# Patient Record
Sex: MALE | Race: OTHER | Employment: UNEMPLOYED | URBAN - METROPOLITAN AREA
[De-identification: names, ages, dates, MRNs, and addresses within clinical notes are randomized per-mention and may not be internally consistent; named-entity substitution may affect disease eponyms.]

---

## 2021-01-18 ENCOUNTER — APPOINTMENT (OUTPATIENT)
Dept: ULTRASOUND IMAGING | Age: 46
End: 2021-01-18
Attending: INTERNAL MEDICINE

## 2021-01-18 ENCOUNTER — HOSPITAL ENCOUNTER (OUTPATIENT)
Age: 46
Setting detail: OBSERVATION
Discharge: HOME | End: 2021-01-20
Attending: EMERGENCY MEDICINE | Admitting: INTERNAL MEDICINE

## 2021-01-18 ENCOUNTER — APPOINTMENT (OUTPATIENT)
Dept: CT IMAGING | Age: 46
End: 2021-01-18
Attending: EMERGENCY MEDICINE

## 2021-01-18 DIAGNOSIS — R18.8 CIRRHOSIS OF LIVER WITH ASCITES, UNSPECIFIED HEPATIC CIRRHOSIS TYPE (HCC): Primary | ICD-10-CM

## 2021-01-18 DIAGNOSIS — K74.60 CIRRHOSIS OF LIVER WITH ASCITES, UNSPECIFIED HEPATIC CIRRHOSIS TYPE (HCC): Primary | ICD-10-CM

## 2021-01-18 PROBLEM — F10.10 ALCOHOL ABUSE: Status: ACTIVE | Noted: 2021-01-18

## 2021-01-18 LAB
ALBUMIN SERPL-MCNC: 2.7 G/DL (ref 3.5–5)
ALBUMIN/GLOB SERPL: 0.5 {RATIO} (ref 1.1–2.2)
ALP SERPL-CCNC: 139 U/L (ref 45–117)
ALT SERPL-CCNC: 14 U/L (ref 12–78)
AMMONIA PLAS-SCNC: 28 UMOL/L
AMPHET UR QL SCN: NEGATIVE
ANION GAP SERPL CALC-SCNC: 4 MMOL/L (ref 5–15)
APPEARANCE UR: ABNORMAL
AST SERPL-CCNC: 27 U/L (ref 15–37)
BACTERIA URNS QL MICRO: NEGATIVE /HPF
BARBITURATES UR QL SCN: NEGATIVE
BASOPHILS # BLD: 0.1 K/UL (ref 0–0.1)
BASOPHILS NFR BLD: 1 % (ref 0–1)
BENZODIAZ UR QL: NEGATIVE
BILIRUB SERPL-MCNC: 0.8 MG/DL (ref 0.2–1)
BILIRUB UR QL CFM: NEGATIVE
BUN SERPL-MCNC: 9 MG/DL (ref 6–20)
BUN/CREAT SERPL: 9 (ref 12–20)
CALCIUM SERPL-MCNC: 8.6 MG/DL (ref 8.5–10.1)
CANNABINOIDS UR QL SCN: NEGATIVE
CEA SERPL-MCNC: 1.8 NG/ML
CHLORIDE SERPL-SCNC: 101 MMOL/L (ref 97–108)
CHOLEST SERPL-MCNC: 98 MG/DL
CO2 SERPL-SCNC: 28 MMOL/L (ref 21–32)
COCAINE UR QL SCN: NEGATIVE
COLOR UR: ABNORMAL
COMMENT, HOLDF: NORMAL
CREAT SERPL-MCNC: 1.03 MG/DL (ref 0.7–1.3)
DIFFERENTIAL METHOD BLD: ABNORMAL
DRUG SCRN COMMENT,DRGCM: ABNORMAL
EOSINOPHIL # BLD: 0.4 K/UL (ref 0–0.4)
EOSINOPHIL NFR BLD: 6 % (ref 0–7)
EPITH CASTS URNS QL MICRO: ABNORMAL /LPF
ERYTHROCYTE [DISTWIDTH] IN BLOOD BY AUTOMATED COUNT: 12.7 % (ref 11.5–14.5)
EST. AVERAGE GLUCOSE BLD GHB EST-MCNC: 94 MG/DL
ETHANOL SERPL-MCNC: <10 MG/DL
GLOBULIN SER CALC-MCNC: 5.5 G/DL (ref 2–4)
GLUCOSE SERPL-MCNC: 106 MG/DL (ref 65–100)
GLUCOSE UR STRIP.AUTO-MCNC: NEGATIVE MG/DL
HAV IGM SER QL: NONREACTIVE
HBA1C MFR BLD: 4.9 % (ref 4–5.6)
HBV CORE IGM SER QL: NONREACTIVE
HBV SURFACE AG SER QL: <0.1 INDEX
HBV SURFACE AG SER QL: NEGATIVE
HCT VFR BLD AUTO: 39.6 % (ref 36.6–50.3)
HCV AB SERPL QL IA: NONREACTIVE
HCV COMMENT,HCGAC: NORMAL
HDLC SERPL-MCNC: 29 MG/DL
HDLC SERPL: 3.4 {RATIO} (ref 0–5)
HGB BLD-MCNC: 13.2 G/DL (ref 12.1–17)
HGB UR QL STRIP: NEGATIVE
HYALINE CASTS URNS QL MICRO: ABNORMAL /LPF (ref 0–5)
IMM GRANULOCYTES # BLD AUTO: 0 K/UL (ref 0–0.04)
IMM GRANULOCYTES NFR BLD AUTO: 0 % (ref 0–0.5)
INR PPP: 1.2 (ref 0.9–1.1)
KETONES UR QL STRIP.AUTO: ABNORMAL MG/DL
LACTATE SERPL-SCNC: 1.2 MMOL/L (ref 0.4–2)
LDLC SERPL CALC-MCNC: 55.8 MG/DL (ref 0–100)
LEUKOCYTE ESTERASE UR QL STRIP.AUTO: NEGATIVE
LIPASE SERPL-CCNC: 100 U/L (ref 73–393)
LIPID PROFILE,FLP: NORMAL
LYMPHOCYTES # BLD: 1 K/UL (ref 0.8–3.5)
LYMPHOCYTES NFR BLD: 15 % (ref 12–49)
MCH RBC QN AUTO: 31.3 PG (ref 26–34)
MCHC RBC AUTO-ENTMCNC: 33.3 G/DL (ref 30–36.5)
MCV RBC AUTO: 93.8 FL (ref 80–99)
METHADONE UR QL: NEGATIVE
MONOCYTES # BLD: 0.7 K/UL (ref 0–1)
MONOCYTES NFR BLD: 11 % (ref 5–13)
NEUTS SEG # BLD: 4.4 K/UL (ref 1.8–8)
NEUTS SEG NFR BLD: 67 % (ref 32–75)
NITRITE UR QL STRIP.AUTO: NEGATIVE
NRBC # BLD: 0 K/UL (ref 0–0.01)
NRBC BLD-RTO: 0 PER 100 WBC
OPIATES UR QL: POSITIVE
PCP UR QL: NEGATIVE
PH UR STRIP: 6 [PH] (ref 5–8)
PLATELET # BLD AUTO: 445 K/UL (ref 150–400)
PMV BLD AUTO: 8.8 FL (ref 8.9–12.9)
POTASSIUM SERPL-SCNC: 3.8 MMOL/L (ref 3.5–5.1)
PROT SERPL-MCNC: 8.2 G/DL (ref 6.4–8.2)
PROT UR STRIP-MCNC: 30 MG/DL
PROTHROMBIN TIME: 11.9 SEC (ref 9–11.1)
RBC # BLD AUTO: 4.22 M/UL (ref 4.1–5.7)
RBC #/AREA URNS HPF: ABNORMAL /HPF (ref 0–5)
SAMPLES BEING HELD,HOLD: NORMAL
SODIUM SERPL-SCNC: 133 MMOL/L (ref 136–145)
SP GR UR REFRACTOMETRY: 1.03 (ref 1–1.03)
SP1: NORMAL
SP2: NORMAL
SP3: NORMAL
TRIGL SERPL-MCNC: 66 MG/DL (ref ?–150)
TSH SERPL DL<=0.05 MIU/L-ACNC: 4.18 UIU/ML (ref 0.36–3.74)
UR CULT HOLD, URHOLD: NORMAL
UROBILINOGEN UR QL STRIP.AUTO: 1 EU/DL (ref 0.2–1)
VLDLC SERPL CALC-MCNC: 13.2 MG/DL
WBC # BLD AUTO: 6.6 K/UL (ref 4.1–11.1)
WBC URNS QL MICRO: ABNORMAL /HPF (ref 0–4)

## 2021-01-18 PROCEDURE — 85610 PROTHROMBIN TIME: CPT

## 2021-01-18 PROCEDURE — 83516 IMMUNOASSAY NONANTIBODY: CPT

## 2021-01-18 PROCEDURE — 96374 THER/PROPH/DIAG INJ IV PUSH: CPT

## 2021-01-18 PROCEDURE — 81001 URINALYSIS AUTO W/SCOPE: CPT

## 2021-01-18 PROCEDURE — 99284 EMERGENCY DEPT VISIT MOD MDM: CPT

## 2021-01-18 PROCEDURE — 99218 HC RM OBSERVATION: CPT

## 2021-01-18 PROCEDURE — 74177 CT ABD & PELVIS W/CONTRAST: CPT

## 2021-01-18 PROCEDURE — 82378 CARCINOEMBRYONIC ANTIGEN: CPT

## 2021-01-18 PROCEDURE — 83605 ASSAY OF LACTIC ACID: CPT

## 2021-01-18 PROCEDURE — 83036 HEMOGLOBIN GLYCOSYLATED A1C: CPT

## 2021-01-18 PROCEDURE — 80053 COMPREHEN METABOLIC PANEL: CPT

## 2021-01-18 PROCEDURE — 80307 DRUG TEST PRSMV CHEM ANLYZR: CPT

## 2021-01-18 PROCEDURE — 83520 IMMUNOASSAY QUANT NOS NONAB: CPT

## 2021-01-18 PROCEDURE — 74011250636 HC RX REV CODE- 250/636: Performed by: EMERGENCY MEDICINE

## 2021-01-18 PROCEDURE — 86301 IMMUNOASSAY TUMOR CA 19-9: CPT

## 2021-01-18 PROCEDURE — 80061 LIPID PANEL: CPT

## 2021-01-18 PROCEDURE — 85025 COMPLETE CBC W/AUTO DIFF WBC: CPT

## 2021-01-18 PROCEDURE — 83690 ASSAY OF LIPASE: CPT

## 2021-01-18 PROCEDURE — 82105 ALPHA-FETOPROTEIN SERUM: CPT

## 2021-01-18 PROCEDURE — 74011000636 HC RX REV CODE- 636: Performed by: RADIOLOGY

## 2021-01-18 PROCEDURE — 84443 ASSAY THYROID STIM HORMONE: CPT

## 2021-01-18 PROCEDURE — 74011250636 HC RX REV CODE- 250/636: Performed by: INTERNAL MEDICINE

## 2021-01-18 PROCEDURE — 86256 FLUORESCENT ANTIBODY TITER: CPT

## 2021-01-18 PROCEDURE — 74011250637 HC RX REV CODE- 250/637: Performed by: INTERNAL MEDICINE

## 2021-01-18 PROCEDURE — 36415 COLL VENOUS BLD VENIPUNCTURE: CPT

## 2021-01-18 PROCEDURE — 80074 ACUTE HEPATITIS PANEL: CPT

## 2021-01-18 PROCEDURE — 86038 ANTINUCLEAR ANTIBODIES: CPT

## 2021-01-18 PROCEDURE — 82140 ASSAY OF AMMONIA: CPT

## 2021-01-18 PROCEDURE — 82077 ASSAY SPEC XCP UR&BREATH IA: CPT

## 2021-01-18 RX ORDER — MULTIVITAMIN WITH IRON
1 TABLET ORAL DAILY
Status: DISCONTINUED | OUTPATIENT
Start: 2021-01-19 | End: 2021-01-20 | Stop reason: HOSPADM

## 2021-01-18 RX ORDER — ONDANSETRON 4 MG/1
4 TABLET, ORALLY DISINTEGRATING ORAL
Status: DISCONTINUED | OUTPATIENT
Start: 2021-01-18 | End: 2021-01-20 | Stop reason: HOSPADM

## 2021-01-18 RX ORDER — HYDROMORPHONE HYDROCHLORIDE 1 MG/ML
0.5 INJECTION, SOLUTION INTRAMUSCULAR; INTRAVENOUS; SUBCUTANEOUS ONCE
Status: COMPLETED | OUTPATIENT
Start: 2021-01-18 | End: 2021-01-18

## 2021-01-18 RX ORDER — ACETAMINOPHEN 650 MG/1
650 SUPPOSITORY RECTAL
Status: DISCONTINUED | OUTPATIENT
Start: 2021-01-18 | End: 2021-01-20 | Stop reason: HOSPADM

## 2021-01-18 RX ORDER — DEXTROSE, SODIUM CHLORIDE, AND POTASSIUM CHLORIDE 5; .45; .15 G/100ML; G/100ML; G/100ML
50 INJECTION INTRAVENOUS CONTINUOUS
Status: DISCONTINUED | OUTPATIENT
Start: 2021-01-18 | End: 2021-01-20 | Stop reason: HOSPADM

## 2021-01-18 RX ORDER — ONDANSETRON 2 MG/ML
4 INJECTION INTRAMUSCULAR; INTRAVENOUS
Status: DISCONTINUED | OUTPATIENT
Start: 2021-01-18 | End: 2021-01-20 | Stop reason: HOSPADM

## 2021-01-18 RX ORDER — ACETAMINOPHEN 325 MG/1
650 TABLET ORAL
Status: DISCONTINUED | OUTPATIENT
Start: 2021-01-18 | End: 2021-01-20 | Stop reason: HOSPADM

## 2021-01-18 RX ORDER — ASPIRIN 325 MG/1
100 TABLET, FILM COATED ORAL DAILY
Status: DISCONTINUED | OUTPATIENT
Start: 2021-01-18 | End: 2021-01-20 | Stop reason: HOSPADM

## 2021-01-18 RX ORDER — ACETAMINOPHEN 325 MG/1
650 TABLET ORAL
COMMUNITY
End: 2021-02-12

## 2021-01-18 RX ORDER — POLYETHYLENE GLYCOL 3350 17 G/17G
17 POWDER, FOR SOLUTION ORAL DAILY PRN
Status: DISCONTINUED | OUTPATIENT
Start: 2021-01-18 | End: 2021-01-20 | Stop reason: HOSPADM

## 2021-01-18 RX ORDER — FAMOTIDINE 20 MG/1
20 TABLET, FILM COATED ORAL 2 TIMES DAILY
Status: DISCONTINUED | OUTPATIENT
Start: 2021-01-18 | End: 2021-01-20 | Stop reason: HOSPADM

## 2021-01-18 RX ORDER — FOLIC ACID 1 MG/1
1 TABLET ORAL DAILY
Status: DISCONTINUED | OUTPATIENT
Start: 2021-01-19 | End: 2021-01-20 | Stop reason: HOSPADM

## 2021-01-18 RX ADMIN — FAMOTIDINE 20 MG: 20 TABLET, FILM COATED ORAL at 13:46

## 2021-01-18 RX ADMIN — IOPAMIDOL 100 ML: 755 INJECTION, SOLUTION INTRAVENOUS at 08:44

## 2021-01-18 RX ADMIN — ACETAMINOPHEN 650 MG: 325 TABLET ORAL at 17:08

## 2021-01-18 RX ADMIN — Medication 100 MG: at 13:46

## 2021-01-18 RX ADMIN — HYDROMORPHONE HYDROCHLORIDE 0.5 MG: 1 INJECTION, SOLUTION INTRAMUSCULAR; INTRAVENOUS; SUBCUTANEOUS at 08:20

## 2021-01-18 RX ADMIN — POTASSIUM CHLORIDE, DEXTROSE MONOHYDRATE AND SODIUM CHLORIDE 50 ML/HR: 150; 5; 450 INJECTION, SOLUTION INTRAVENOUS at 13:46

## 2021-01-18 RX ADMIN — FAMOTIDINE 20 MG: 20 TABLET, FILM COATED ORAL at 17:08

## 2021-01-18 NOTE — H&P
[facility]    Hospitalist Admission Note      NAME:  Dipesh Manrique   :   1975   MRN:  [id number]     PCP:  None     Date/Time of service:  2021 10:36 AM          Subjective:     CHIEF COMPLAINT: ascites     HISTORY OF PRESENT ILLNESS:     Mr. Manrique is a 46 y.o.   male who presented to the Emergency Department complaining of ascites.  Worsening over the month.  Hx of alcohol abuse but claims no alcohol for a month.  ER finds ascites, CT shows BCD dilation, but labs do not show signs of cirrhosis in proportion to ascitic changes. We will admit him for observation.      No past medical history on file.     No past surgical history on file.    Social History     Tobacco Use   • Smoking status: Not on file   Substance Use Topics   • Alcohol use: Not on file        No family history on file.   Family hx cannot be fully assessed, since the patient cannot provide information    No Known Allergies     Prior to Admission medications    Not on File       Review of Systems:  (bold if positive, if negative)    Gen:  Eyes:  ENT:  CVS:  Pulm:  GI:  Abdominal pain,:  MS:  Skin:  Psych:  Endo:  Hem:  Renal:  Neuro:        Objective:      VITALS:    Vital signs reviewed; most recent are:    Visit Vitals  /83 (BP 1 Location: Right arm, BP Patient Position: At rest)   Pulse 83   Temp 97.9 °F (36.6 °C)   Resp 16   SpO2 98%     SpO2 Readings from Last 6 Encounters:   21 98%        No intake or output data in the 24 hours ending 21 1036     Exam:     Physical Exam:    Gen:  Well-developed, well-nourished, in no acute distress  HEENT:  Pink conjunctivae, PERRL, hearing intact to voice, moist mucous membranes  Neck:  Supple, without masses, thyroid non-tender  Resp:  No accessory muscle use, clear breath sounds without wheezes rales or rhonchi  Card:  No murmurs, normal S1, S2 without thrills, bruits or peripheral edema  Abd:  Firm, tender, distended, distant bowel sounds are  present, no mass  Lymph:  No cervical or inguinal adenopathy  Musc:  No cyanosis or clubbing  Skin:  No rashes or ulcers, skin turgor is good  Neuro:  Cranial nerves are grossly intact, no focal motor weakness, follows commands appropriately  Psych:  Good insight, oriented to person, place and time, alert     Labs:    Recent Labs     01/18/21 0626   WBC 6.6   HGB 13.2   HCT 39.6   *     Recent Labs     01/18/21 0626   *   K 3.8      CO2 28   *   BUN 9   CREA 1.03   CA 8.6   ALB 2.7*   TBILI 0.8   ALT 14     No results found for: GLUCPOC  No results for input(s): PH, PCO2, PO2, HCO3, FIO2 in the last 72 hours. Recent Labs     01/18/21 0626   INR 1.2*     All Micro Results     Procedure Component Value Units Date/Time    CULTURE, BODY FLUID [[id number]]     Order Status: Sent Specimen: Body Fluid from Ascitic Fluid     AFB CULTURE & SMEAR W/REFLEX ID [[id number]]     Order Status: Sent     URINE CULTURE HOLD SAMPLE [[id number]] Collected: 01/18/21 0841    Order Status: Completed Specimen: Serum Updated: 01/18/21 0845     Urine culture hold       Urine on hold in Microbiology dept for 2 days. If unpreserved urine is submitted, it cannot be used for addtional testing after 24 hours, recollection will be required. I have reviewed previous records       Assessment and Plan:      Ascites - POA, unclear etiology. Certainly alcoholic cirrhosis is top of DDX, but his labs are unusually benign, only moderately low albumin, so differential includes hepatitis, autoimmune and cancer. Consult GI . Consult IR fo paracentesis and checking fluid labs. Check serologies for autoimmune, infectious and cancer. GI may consider doing ERCP for Bx of CBD. NPO in case they want to proceed. Alcohol abuse - POA, unlikely to go into withdrawal if he is accurate about 1 month sobriety. In any case, goody bag.     Telemetry reviewed:   normal sinus rhythm    Risk of deterioration: high      Total time spent with patient: 54 Minutes I personally reviewed chart, notes, data and current medications in the medical record. I have personally examined and treated the patient at bedside during this period.                  Care Plan discussed with: Family, Nursing Staff, Consultant/Specialist and >50% of time spent in counseling and coordination of care    Discussed:  Care Plan and D/C Planning       ___________________________________________________    Attending Physician: Patrick Ramirez MD

## 2021-01-18 NOTE — PROGRESS NOTES
[facility]: MED RECONCILIATION    Comments/Recommendations:   Reviewed PTA medications with patient via [employer] interpretor #[id number]. Patient reports that he does not currently take any prescription medications and that he only uses as needed tylenol. Recent history 30 day supplies of folic acid 1 mg; furosemide 20 mg; spironolactone 50 mg; pantoprazole 40 mg however patient states he took these for a month from 11/20-12/20 and ran out of supply. Updated preferred outpatient pharmacy, patient reports NKDA    Information obtained from: Patient via interpretor, [employer]    Allergies: Patient has no known allergies. Prior to Admission Medications:     Medication Documentation Review Audit       Reviewed by Sammie Henry, Maria De Jesus Fischer (Pharmacist) on 01/18/21 at 1213      Medication Sig Documenting Provider Last Dose Status Taking?   acetaminophen (TYLENOL) 325 mg tablet Take 650 mg by mouth every six (6) hours as needed for Pain.  Provider, Historical 1/11/2021 Active Yes                  Thanks,  Nadira Chang, PHARMD   Contact: [phone]

## 2021-01-18 NOTE — ED NOTES
TRANSFER - OUT REPORT:    Verbal report given to Rosario Valencia RN(name) on Antoinette Starling  being transferred to [facility](unit) for routine progression of care       Report consisted of patients Situation, Background, Assessment and   Recommendations(SBAR). Information from the following report(s) SBAR, Kardex, ED Summary and MAR was reviewed with the receiving nurse. Lines:       Opportunity for questions and clarification was provided.       Patient transported with:   [employer]

## 2021-01-18 NOTE — ED PROVIDER NOTES
53-year-old  male with no reported prior past medical history with history of prior EtOH abuse but reported last drink the end of December, presents to the emergency department noting a 1 month history of progressively worsening abdominal distention and pain. He states that his stomach is so large and distended that it hurts more when he walks or moves his trunk. He denies any fever, chills, nausea, vomiting, diarrhea, dysuria, or hematuria. He denies any known history of prior liver disease. He has been taking Tylenol intermittently to no avail of the symptoms. The history is provided by the patient. The history is limited by a language barrier. A  was used. No past medical history on file. No past surgical history on file. No family history on file.     Social History     Socioeconomic History    Marital status: Not on file     Spouse name: Not on file    Number of children: Not on file    Years of education: Not on file    Highest education level: Not on file   Occupational History    Not on file   Social Needs    Financial resource strain: Not on file    Food insecurity     Worry: Not on file     Inability: Not on file    Transportation needs     Medical: Not on file     Non-medical: Not on file   Tobacco Use    Smoking status: Not on file   Substance and Sexual Activity    Alcohol use: Not on file    Drug use: Not on file    Sexual activity: Not on file   Lifestyle    Physical activity     Days per week: Not on file     Minutes per session: Not on file    Stress: Not on file   Relationships    Social connections     Talks on phone: Not on file     Gets together: Not on file     Attends [employer] service: Not on file     Active member of club or organization: Not on file     Attends meetings of clubs or organizations: Not on file     Relationship status: Not on file    Intimate partner violence     Fear of current or ex partner: Not on file Emotionally abused: Not on file     Physically abused: Not on file     Forced sexual activity: Not on file   Other Topics Concern    Not on file   Social History Narrative    Not on file         ALLERGIES: Patient has no known allergies. Review of Systems   Constitutional: Negative for activity change, appetite change, chills and fever. HENT: Negative for congestion, rhinorrhea, sinus pain, sneezing and sore throat. Eyes: Negative for photophobia and visual disturbance. Respiratory: Negative for cough and shortness of breath. Cardiovascular: Negative for chest pain. Gastrointestinal: Positive for abdominal distention and abdominal pain. Negative for blood in stool, constipation, diarrhea, nausea and vomiting. Genitourinary: Negative for difficulty urinating, dysuria, flank pain, hematuria, penile pain and testicular pain. Musculoskeletal: Negative for arthralgias, back pain, myalgias and neck pain. Skin: Negative for rash and wound. Neurological: Negative for syncope, weakness, light-headedness, numbness and headaches. Psychiatric/Behavioral: Negative for self-injury and suicidal ideas. All other systems reviewed and are negative. Vitals:    01/18/21 0613   BP: 114/83   Pulse: 83   Resp: 16   Temp: 97.9 °F (36.6 °C)   SpO2: 98%            Physical Exam  Vitals signs and nursing note reviewed. Constitutional:       General: He is not in acute distress. Appearance: Normal appearance. He is well-developed. He is not diaphoretic. Comments: Cachectic   HENT:      Head: Normocephalic and atraumatic. Nose: Nose normal.   Eyes:      Extraocular Movements: Extraocular movements intact. Conjunctiva/sclera: Conjunctivae normal.      Pupils: Pupils are equal, round, and reactive to light. Neck:      Musculoskeletal: Neck supple. Cardiovascular:      Rate and Rhythm: Normal rate and regular rhythm. Heart sounds: Normal heart sounds.    Pulmonary:      Effort: Pulmonary effort is normal.      Breath sounds: Normal breath sounds. Abdominal:      General: There is distension (Massively distended). Palpations: Abdomen is rigid. There is fluid wave. Tenderness: There is generalized abdominal tenderness. Musculoskeletal:         General: No tenderness. Skin:     General: Skin is warm and dry. Coloration: Skin is jaundiced. Neurological:      General: No focal deficit present. Mental Status: He is alert and oriented to person, place, and time. Cranial Nerves: No cranial nerve deficit. Sensory: No sensory deficit. Motor: No weakness. Coordination: Coordination normal.          MDM   79-year-old male presents with jaundice and abdominal distention. Concern for cirrhosis, likely EtOH induced. Patient is afebrile with vital signs stable no acute distress. He was given IV pain medication and labs were drawn to further evaluate as well as CT abdomen pelvis and UA. While awaiting these results his care was signed out to Dr. Mirza Orr at 7 AM.  Please see her note for further information regarding the remainder of his care while in the ED.     Procedures

## 2021-01-18 NOTE — PROGRESS NOTES
Reason for Admission:   Ascities                   RUR Score:   OBS                   Plan for utilizing home health:     Unsure, but no ins. Current Advanced Directive/Advance Care Plan: Full, No ACP                         Transition of Care Plan:    I met with the patient using the . Patient lives with family in in a 2 story home. No pharmacy of choice. No prior HH or DME. No PCP Patient is independent on ADL's but does not drive. Plan:  1. Monitor patient's response to treatment. 2. Patient will most likely not qualify for Medicare. No ins. 3. Friend to transport at discharge. 4. Unsure of needs. 5. Case Management to follow. OBS letter received and signed, copy on chart. Care Management Interventions  PCP Verified by CM: (none)  Mode of Transport at Discharge:  Other (see comment)(friend)  Transition of Care Consult (CM Consult): Discharge Planning  MyChart Signup: No  Discharge Durable Medical Equipment: No  Health Maintenance Reviewed: Yes  Physical Therapy Consult: No  Occupational Therapy Consult: No  Speech Therapy Consult: No  Current Support Network: Relative's Home  Confirm Follow Up Transport: Friends  [place] Resource Information Provided?: No  Discharge Location  Discharge Placement: Home      [facility]

## 2021-01-19 ENCOUNTER — APPOINTMENT (OUTPATIENT)
Dept: ULTRASOUND IMAGING | Age: 46
End: 2021-01-19
Attending: INTERNAL MEDICINE

## 2021-01-19 LAB
AFP-TM SERPL-MCNC: 2.3 NG/ML (ref 0–8.3)
ALBUMIN FLD-MCNC: 1.8 G/DL
ALBUMIN SERPL-MCNC: 2.6 G/DL (ref 3.5–5)
ALBUMIN/GLOB SERPL: 0.6 {RATIO} (ref 1.1–2.2)
ALP SERPL-CCNC: 112 U/L (ref 45–117)
ALT SERPL-CCNC: 12 U/L (ref 12–78)
ANA SER QL: NEGATIVE
ANION GAP SERPL CALC-SCNC: 4 MMOL/L (ref 5–15)
APPEARANCE FLD: CLEAR
AST SERPL-CCNC: 24 U/L (ref 15–37)
BILIRUB SERPL-MCNC: 0.9 MG/DL (ref 0.2–1)
BUN SERPL-MCNC: 12 MG/DL (ref 6–20)
BUN/CREAT SERPL: 12 (ref 12–20)
C-ANCA TITR SER IF: NORMAL TITER
CALCIUM SERPL-MCNC: 8.3 MG/DL (ref 8.5–10.1)
CANCER AG19-9 SERPL-ACNC: <2 U/ML (ref 0–35)
CHLORIDE SERPL-SCNC: 103 MMOL/L (ref 97–108)
CO2 SERPL-SCNC: 26 MMOL/L (ref 21–32)
COLOR FLD: YELLOW
CREAT SERPL-MCNC: 0.97 MG/DL (ref 0.7–1.3)
ERYTHROCYTE [DISTWIDTH] IN BLOOD BY AUTOMATED COUNT: 12.9 % (ref 11.5–14.5)
FOLATE SERPL-MCNC: 21.4 NG/ML (ref 5–21)
GLOBULIN SER CALC-MCNC: 4.7 G/DL (ref 2–4)
GLUCOSE SERPL-MCNC: 104 MG/DL (ref 65–100)
HCT VFR BLD AUTO: 36.2 % (ref 36.6–50.3)
HGB BLD-MCNC: 11.9 G/DL (ref 12.1–17)
INR PPP: 1.2 (ref 0.9–1.1)
LDH FLD L TO P-CCNC: 74 U/L
LYMPHOCYTES NFR FLD: 51 %
MAGNESIUM SERPL-MCNC: 2.2 MG/DL (ref 1.6–2.4)
MCH RBC QN AUTO: 30.5 PG (ref 26–34)
MCHC RBC AUTO-ENTMCNC: 32.9 G/DL (ref 30–36.5)
MCV RBC AUTO: 92.8 FL (ref 80–99)
MITOCHONDRIA M2 IGG SER-ACNC: 23.9 UNITS (ref 0–20)
MONOS+MACROS NFR FLD: 46 %
MYELOPEROXIDASE AB SER IA-ACNC: <9 U/ML (ref 0–9)
NEUTROPHILS NFR FLD: 3 %
NRBC # BLD: 0 K/UL (ref 0–0.01)
NRBC BLD-RTO: 0 PER 100 WBC
NUC CELL # FLD: 184 /CU MM
P-ANCA ATYPICAL TITR SER IF: NORMAL TITER
P-ANCA TITR SER IF: NORMAL TITER
PHOSPHATE SERPL-MCNC: 4 MG/DL (ref 2.6–4.7)
PLATELET # BLD AUTO: 424 K/UL (ref 150–400)
PMV BLD AUTO: 9.3 FL (ref 8.9–12.9)
POTASSIUM SERPL-SCNC: 4.3 MMOL/L (ref 3.5–5.1)
PROT FLD-MCNC: 4 G/DL
PROT SERPL-MCNC: 7.3 G/DL (ref 6.4–8.2)
PROTEINASE3 AB SER IA-ACNC: <3.5 U/ML (ref 0–3.5)
PROTHROMBIN TIME: 12.2 SEC (ref 9–11.1)
RBC # BLD AUTO: 3.9 M/UL (ref 4.1–5.7)
RBC # FLD: 6 /CU MM
SODIUM SERPL-SCNC: 133 MMOL/L (ref 136–145)
SPECIMEN SOURCE FLD: ABNORMAL
SPECIMEN SOURCE FLD: NORMAL
VIT B12 SERPL-MCNC: 479 PG/ML (ref 193–986)
WBC # BLD AUTO: 6.5 K/UL (ref 4.1–11.1)

## 2021-01-19 PROCEDURE — 88305 TISSUE EXAM BY PATHOLOGIST: CPT

## 2021-01-19 PROCEDURE — 83615 LACTATE (LD) (LDH) ENZYME: CPT

## 2021-01-19 PROCEDURE — 87015 SPECIMEN INFECT AGNT CONCNTJ: CPT

## 2021-01-19 PROCEDURE — 36415 COLL VENOUS BLD VENIPUNCTURE: CPT

## 2021-01-19 PROCEDURE — 82607 VITAMIN B-12: CPT

## 2021-01-19 PROCEDURE — 49083 ABD PARACENTESIS W/IMAGING: CPT

## 2021-01-19 PROCEDURE — 82746 ASSAY OF FOLIC ACID SERUM: CPT

## 2021-01-19 PROCEDURE — 82042 OTHER SOURCE ALBUMIN QUAN EA: CPT

## 2021-01-19 PROCEDURE — 74011250636 HC RX REV CODE- 250/636: Performed by: INTERNAL MEDICINE

## 2021-01-19 PROCEDURE — 96375 TX/PRO/DX INJ NEW DRUG ADDON: CPT

## 2021-01-19 PROCEDURE — P9047 ALBUMIN (HUMAN), 25%, 50ML: HCPCS | Performed by: RADIOLOGY

## 2021-01-19 PROCEDURE — 84157 ASSAY OF PROTEIN OTHER: CPT

## 2021-01-19 PROCEDURE — 83735 ASSAY OF MAGNESIUM: CPT

## 2021-01-19 PROCEDURE — 87205 SMEAR GRAM STAIN: CPT

## 2021-01-19 PROCEDURE — 85027 COMPLETE CBC AUTOMATED: CPT

## 2021-01-19 PROCEDURE — 87116 MYCOBACTERIA CULTURE: CPT

## 2021-01-19 PROCEDURE — 99218 HC RM OBSERVATION: CPT

## 2021-01-19 PROCEDURE — 84100 ASSAY OF PHOSPHORUS: CPT

## 2021-01-19 PROCEDURE — 88112 CYTOPATH CELL ENHANCE TECH: CPT

## 2021-01-19 PROCEDURE — 80053 COMPREHEN METABOLIC PANEL: CPT

## 2021-01-19 PROCEDURE — 74011000250 HC RX REV CODE- 250: Performed by: INTERNAL MEDICINE

## 2021-01-19 PROCEDURE — 74011250636 HC RX REV CODE- 250/636: Performed by: RADIOLOGY

## 2021-01-19 PROCEDURE — 85610 PROTHROMBIN TIME: CPT

## 2021-01-19 PROCEDURE — 89050 BODY FLUID CELL COUNT: CPT

## 2021-01-19 PROCEDURE — 74011250637 HC RX REV CODE- 250/637: Performed by: INTERNAL MEDICINE

## 2021-01-19 PROCEDURE — 74011000250 HC RX REV CODE- 250: Performed by: RADIOLOGY

## 2021-01-19 RX ORDER — LORAZEPAM 2 MG/ML
2 INJECTION INTRAMUSCULAR
Status: DISCONTINUED | OUTPATIENT
Start: 2021-01-19 | End: 2021-01-20 | Stop reason: HOSPADM

## 2021-01-19 RX ORDER — ALBUMIN HUMAN 250 G/1000ML
25 SOLUTION INTRAVENOUS
Status: DISCONTINUED | OUTPATIENT
Start: 2021-01-19 | End: 2021-01-20 | Stop reason: HOSPADM

## 2021-01-19 RX ORDER — LORAZEPAM 2 MG/ML
4 INJECTION INTRAMUSCULAR
Status: DISCONTINUED | OUTPATIENT
Start: 2021-01-19 | End: 2021-01-20 | Stop reason: HOSPADM

## 2021-01-19 RX ORDER — LIDOCAINE HYDROCHLORIDE 10 MG/ML
10 INJECTION, SOLUTION EPIDURAL; INFILTRATION; INTRACAUDAL; PERINEURAL
Status: COMPLETED | OUTPATIENT
Start: 2021-01-19 | End: 2021-01-19

## 2021-01-19 RX ADMIN — FOLIC ACID 1 MG: 1 TABLET ORAL at 08:53

## 2021-01-19 RX ADMIN — FAMOTIDINE 20 MG: 20 TABLET, FILM COATED ORAL at 08:52

## 2021-01-19 RX ADMIN — Medication 1 TABLET: at 08:52

## 2021-01-19 RX ADMIN — CEFTRIAXONE 1 G: 1 INJECTION, POWDER, FOR SOLUTION INTRAMUSCULAR; INTRAVENOUS at 16:51

## 2021-01-19 RX ADMIN — POTASSIUM CHLORIDE, DEXTROSE MONOHYDRATE AND SODIUM CHLORIDE 50 ML/HR: 150; 5; 450 INJECTION, SOLUTION INTRAVENOUS at 16:53

## 2021-01-19 RX ADMIN — Medication 100 MG: at 08:53

## 2021-01-19 RX ADMIN — ACETAMINOPHEN 650 MG: 325 TABLET ORAL at 04:13

## 2021-01-19 RX ADMIN — FAMOTIDINE 20 MG: 20 TABLET, FILM COATED ORAL at 17:09

## 2021-01-19 RX ADMIN — ALBUMIN (HUMAN) 25 G: 0.25 INJECTION, SOLUTION INTRAVENOUS at 14:45

## 2021-01-19 RX ADMIN — LIDOCAINE HYDROCHLORIDE 10 ML: 10 INJECTION, SOLUTION EPIDURAL; INFILTRATION; INTRACAUDAL; PERINEURAL at 15:00

## 2021-01-19 NOTE — PROGRESS NOTES
Transition of Care Plan; OBS  1. Monitor patient's response to treatment. 2. Patient will most likely not qualify for Medicare. No ins. 3. Friend to transport at discharge. 4. Will need paracentesis and transfuse as needed. 5. Case Management to follow.   BULL Jauregui

## 2021-01-19 NOTE — CONSULTS
Rayray Cuevas MD  [phone] office  [phone] voicemail   Gastroenterology Consultation Note      Admit Date: 1/18/2021  Consult Date: 1/19/2021   I greatly appreciate your asking me to see Dipesh Manrique, thank you very much for the opportunity to participate in his care.    Narrative Assessment and Plan   · New onset ascites  · Cirrhotic appearing liver  · Mild elevation in INR    Screening serologic tests thus far are negative, some are still pending.  Needs paracentesis for comfort and fluid analysis  Will need ethanol avoidance and low sodium diet on discharge  Awaiting paracentesis and fluid analysis to finalize recommendations and ensure this is just transudative ascites of cirrhosis.  CEA, AFP,  and imaging not suggestive of neoplasm.  On discharge would advise follow up at [facility] liver clinic for longitudinal care.  Following.    Subjective:     Chief Complaint: Abdominal Pain    History of Present Illness: Abdominal pain, diffuse, days, associated with abdominal swelling.    PCP:  None    No past medical history on file.     No past surgical history on file.    Social History     Tobacco Use   • Smoking status: Not on file   Substance Use Topics   • Alcohol use: Not on file        No family history on file.     No Known Allergies         Home Medications:  Prior to Admission Medications   Prescriptions Last Dose Informant Patient Reported? Taking?   acetaminophen (TYLENOL) 325 mg tablet 1/11/2021 Self Yes Yes   Sig: Take 650 mg by mouth every six (6) hours as needed for Pain.      Facility-Administered Medications: None       Hospital Medications:  Current Facility-Administered Medications   Medication Dose Route Frequency   • LORazepam (ATIVAN) injection 2 mg  2 mg IntraVENous Q1H PRN   • LORazepam (ATIVAN) injection 4 mg  4 mg IntraVENous Q1H PRN   • acetaminophen (TYLENOL) tablet 650 mg  650 mg Oral Q6H PRN    Or   • acetaminophen (TYLENOL)  suppository 650 mg  650 mg Rectal Q6H PRN    polyethylene glycol (MIRALAX) packet 17 g  17 g Oral DAILY PRN    ondansetron (ZOFRAN ODT) tablet 4 mg  4 mg Oral Q8H PRN    Or    ondansetron (ZOFRAN) injection 4 mg  4 mg IntraVENous Q6H PRN    famotidine (PEPCID) tablet 20 mg  20 mg Oral BID    dextrose 5% - 0.45% NaCl with KCl 20 mEq/L infusion  50 mL/hr IntraVENous CONTINUOUS    thiamine mononitrate (B-1) tablet 100 mg  100 mg Oral DAILY    folic acid (FOLVITE) tablet 1 mg  1 mg Oral DAILY    multivitamin with iron tablet 1 Tab  1 Tab Oral DAILY           Objective:     Physical Exam:  Visit Vitals  /81 (BP 1 Location: Right arm, BP Patient Position: At rest)   Pulse 79   Temp 98.3 °F (36.8 °C)   Resp 16   Wt 61.6 kg (135 lb 11.2 oz)   SpO2 98%     SpO2 Readings from Last 6 Encounters:   01/19/21 98%            Intake/Output Summary (Last 24 hours) at 1/19/2021 1155  Last data filed at 1/19/2021 0454  Gross per 24 hour   Intake 756.67 ml   Output    Net 756.67 ml      General: no distress, comfortable  Skin:  No rash or palpable dermatologic mass lesions  HEENT: Pupils equal, sclera anicteric, oropharynx with no gross lesions  Cardiovascular: No abnormal audible heart sounds, well perfused  Respiratory:  No abnormal audible breath sounds, normal respiratory effort, no throacic deformity  GI:  Abdomen nondistended with tense ascites, no mass,  no rebound or guarding. Musculoskeletal:  No skeletal deformity nor acute arthritis noted.   Neurological:  Motor and sensory function intact in upper extremeties    Laboratory:    Recent Results (from the past 24 hour(s))   METABOLIC PANEL, COMPREHENSIVE    Collection Time: 01/19/21  6:56 AM   Result Value Ref Range    Sodium 133 (L) 136 - 145 mmol/L    Potassium 4.3 3.5 - 5.1 mmol/L    Chloride 103 97 - 108 mmol/L    CO2 26 21 - 32 mmol/L    Anion gap 4 (L) 5 - 15 mmol/L    Glucose 104 (H) 65 - 100 mg/dL    BUN 12 6 - 20 MG/DL    Creatinine 0.97 0.70 - 1.30 MG/DL    BUN/Creatinine ratio 12 12 - 20      GFR est AA >60 >60 ml/min/1.73m2    GFR est non-AA >60 >60 ml/min/1.73m2    Calcium 8.3 (L) 8.5 - 10.1 MG/DL    Bilirubin, total 0.9 0.2 - 1.0 MG/DL    ALT (SGPT) 12 12 - 78 U/L    AST (SGOT) 24 15 - 37 U/L    Alk. phosphatase 112 45 - 117 U/L    Protein, total 7.3 6.4 - 8.2 g/dL    Albumin 2.6 (L) 3.5 - 5.0 g/dL    Globulin 4.7 (H) 2.0 - 4.0 g/dL    A-G Ratio 0.6 (L) 1.1 - 2.2     MAGNESIUM    Collection Time: 01/19/21  6:56 AM   Result Value Ref Range    Magnesium 2.2 1.6 - 2.4 mg/dL   CBC W/O DIFF    Collection Time: 01/19/21  6:56 AM   Result Value Ref Range    WBC 6.5 4.1 - 11.1 K/uL    RBC 3.90 (L) 4.10 - 5.70 M/uL    HGB 11.9 (L) 12.1 - 17.0 g/dL    HCT 36.2 (L) 36.6 - 50.3 %    MCV 92.8 80.0 - 99.0 FL    MCH 30.5 26.0 - 34.0 PG    MCHC 32.9 30.0 - 36.5 g/dL    RDW 12.9 11.5 - 14.5 %    PLATELET 499 (H) 896 - 400 K/uL    MPV 9.3 8.9 - 12.9 FL    NRBC 0.0 0  WBC    ABSOLUTE NRBC 0.00 0.00 - 0.01 K/uL   PHOSPHORUS    Collection Time: 01/19/21  6:56 AM   Result Value Ref Range    Phosphorus 4.0 2.6 - 4.7 MG/DL   PROTHROMBIN TIME + INR    Collection Time: 01/19/21  6:56 AM   Result Value Ref Range    INR 1.2 (H) 0.9 - 1.1      Prothrombin time 12.2 (H) 9.0 - 11.1 sec         Assessment/Plan:     Principal Problem:    Ascites (1/18/2021)    Active Problems:    Alcohol abuse (1/18/2021)         See above narrative for full detail.

## 2021-01-19 NOTE — PROGRESS NOTES
Pt received to US on stretcher, belly is very distended, pt states he is very uncomfortable. Confirmed NKA, Vitals stable, interpretor services used for consent and translation with Dr José Miguel Eason. Timeout at 1408, procedure start at 1409, pt draining clear yellow fluid from R side abdomen. 1530--Pt drained 99435 mL clear fluid from abdomen, vitals remained stable. Stop time [phone], clean dressing applied to R side. Report called to Gavin Gagnon, returned to room.

## 2021-01-19 NOTE — PROGRESS NOTES
Comprehensive Nutrition Assessment    Type and Reason for Visit: Initial, Positive nutrition screen    Nutrition Recommendations/Plan:   1. Advance diet as medically feasible. Nutrition Assessment:     1/19: 52 y/o male admitted with ascites. PMH includes alcohol abuse. Pt OOR at time of visit, unable to get full nutrition hx at this time. MST received for weight loss and poor appetite. No weight hx in chart to confirm. No height recorded, unable to calculate BMI. Pt currently NPO. Per GI, liver appears cirrhotic, recommending paracentesis. Will monitor for diet advancement and get full hx upon f/u. Labs- Na 133. Meds- folic acid, thiamin, MVI. Malnutrition Assessment:  Malnutrition Status: unable to assess    Estimated Daily Nutrient Needs:  Energy (kcal): 1184(3095-8161); Weight Used for Energy Requirements: Current  Protein (g): 74(1.2-1.5 g/kg);  Weight Used for Protein Requirements: Current  Fluid (ml/day): 1700; Method Used for Fluid Requirements: 1 ml/kcal    Nutrition Related Findings:  LBM 1/18      Wounds:    None       Current Nutrition Therapies:  DIET NPO Except Meds    Anthropometric Measures:  · Height:     · Current Body Wt:  61.6 kg (135 lb 12.9 oz)   · Admission Body Wt:       · Usual Body Wt:        · Ideal Body Wt:   :      · Adjusted Body Weight:   ; Weight Adjustment for: No adjustment   · Adjusted BMI:       · BMI Category:          Nutrition Diagnosis:   · Inadequate oral intake related to inadequate protein-energy intake as evidenced by NPO or clear liquid status due to medical condition    Nutrition Interventions:   Food and/or Nutrient Delivery: Start oral diet  Nutrition Education and Counseling: No recommendations at this time  Coordination of Nutrition Care: Continue to monitor while inpatient    Goals:  Diet advancement with po intake >50% meals next 1-3 days       Nutrition Monitoring and Evaluation:   Behavioral-Environmental Outcomes: None identified  Food/Nutrient Intake Outcomes: Diet advancement/tolerance, Food and nutrient intake  Physical Signs/Symptoms Outcomes: Weight, Biochemical data, GI status    Discharge Planning:     Too soon to determine     Electronically signed by Saleem Lee RDN on 1/19/2021 at 12:57 PM    Contact: [phone]

## 2021-01-19 NOTE — PROGRESS NOTES
GI note    Consult received, chart reviewed, full consult to follow. Ascites; low serum albumin but platelet count, liver enzymes, and bilirubin all normal.  Fluid analysis and screening serologic testing for chronic liver disease pending. Full consult to follow.   Zahida Choudhury MD

## 2021-01-19 NOTE — PROGRESS NOTES
[facility] [place] [facility]  [address] [place] [address]  [phone]      Medical Progress Note      NAME: Pretty Garcia   :  1975  MRM:  [id number]    Date/Time of service: 2021  12:26 PM       Subjective:     Chief Complaint:  Patient was personally seen and examined by me during this time period. Chart reviewed. Patient Portuguese speaker;  used. Endorses epigastric abd pain; otherwise negative ROS. Objective:       Vitals:       Last 24hrs VS reviewed since prior progress note.  Most recent are:    Visit Vitals  /81 (BP 1 Location: Right arm, BP Patient Position: At rest)   Pulse 79   Temp 98.3 °F (36.8 °C)   Resp 16   Wt 61.6 kg (135 lb 11.2 oz)   SpO2 98%     SpO2 Readings from Last 6 Encounters:   21 98%            Intake/Output Summary (Last 24 hours) at 2021 1226  Last data filed at 2021 0454  Gross per 24 hour   Intake 756.67 ml   Output    Net 756.67 ml        Exam:     Physical Exam:    Gen:   in no acute distress  HEENT:  Pink conjunctivae, PERRL, hearing intact to voice  Resp:  No accessory muscle use, clear breath sounds without wheezes rales or rhonchi  Card:  RRR, No murmurs, normal S1, S2, no peripheral edema  Abd:  Soft, non-tender, distended, normoactive bowel sounds are present  Musc:  No cyanosis or clubbing  Skin:  No rashes or ulcers, skin turgor is good  Neuro:  Cranial nerves 3-12 are grossly intact, moves all extremities, follows commands appropriately  Psych:  Oriented to person, place, and time, Alert with good insight      Medications Reviewed: (see below)    Lab Data Reviewed: (see below)    ______________________________________________________________________    Medications:     Current Facility-Administered Medications   Medication Dose Route Frequency    LORazepam (ATIVAN) injection 2 mg  2 mg IntraVENous Q1H PRN    LORazepam (ATIVAN) injection 4 mg  4 mg IntraVENous Q1H PRN    acetaminophen (TYLENOL) tablet 650 mg  650 mg Oral Q6H PRN    Or    acetaminophen (TYLENOL) suppository 650 mg  650 mg Rectal Q6H PRN    polyethylene glycol (MIRALAX) packet 17 g  17 g Oral DAILY PRN    ondansetron (ZOFRAN ODT) tablet 4 mg  4 mg Oral Q8H PRN    Or    ondansetron (ZOFRAN) injection 4 mg  4 mg IntraVENous Q6H PRN    famotidine (PEPCID) tablet 20 mg  20 mg Oral BID    dextrose 5% - 0.45% NaCl with KCl 20 mEq/L infusion  50 mL/hr IntraVENous CONTINUOUS    thiamine mononitrate (B-1) tablet 100 mg  100 mg Oral DAILY    folic acid (FOLVITE) tablet 1 mg  1 mg Oral DAILY    multivitamin with iron tablet 1 Tab  1 Tab Oral DAILY          Lab Review:     Recent Labs     01/19/21  0656 01/18/21  0626   WBC 6.5 6.6   HGB 11.9* 13.2   HCT 36.2* 39.6   * 445*     Recent Labs     01/19/21  0656 01/18/21  0626   * 133*   K 4.3 3.8    101   CO2 26 28   * 106*   BUN 12 9   CREA 0.97 1.03   CA 8.3* 8.6   MG 2.2  --    PHOS 4.0  --    ALB 2.6* 2.7*   TBILI 0.9 0.8   ALT 12 14   INR 1.2* 1.2*     No results found for: GLUCPOC       Assessment / Plan:       Decompensated cirrhosis/ascites/coagulaopthy: new, likely due to alcohol abuse. Hepatitis panel negative. CEA, AFP,  not concerning for neoplasm. HERNÁN negative remaining autoimmune serologies pending. Monitor coags. Plan for paracentesis with IT with pleural studies. IR consulted. GI following. Acute abd pain: doubt SBP, but will cover with ceftriaxone pending cx results.      Hyponatremia: likely due to cirrhosis. Monitor     Anemia: check iron studies, folate and b12. Monitor and transfuse PRN hgb <7. Alcohol abuse - POA, reports last drink 1 month so if true withdrawal unlikely. S/p goody bag. C/w folic acid and thiamine. CIWA with IV Ativan PRN.        Total time spent with patient: 28 Minutes **I personally saw and examined the patient during this time period**                 Care Plan discussed with: Patient    Discussed:  Care Plan    Prophylaxis:  SCD's    Disposition:  Home w/Family           ___________________________________________________    Attending Physician: Jeannine Pena DO

## 2021-01-19 NOTE — PROGRESS NOTES
Bedside shift change report given to Kenroy Peters RN (oncoming nurse) by Yadira Cronin RN (offgoing nurse). Report included the following information SBAR, Kardex, ED Summary, Procedure Summary, MAR and Recent Results.

## 2021-01-20 VITALS
DIASTOLIC BLOOD PRESSURE: 71 MMHG | HEART RATE: 79 BPM | TEMPERATURE: 98.4 F | WEIGHT: 108.1 LBS | RESPIRATION RATE: 16 BRPM | SYSTOLIC BLOOD PRESSURE: 105 MMHG | OXYGEN SATURATION: 98 %

## 2021-01-20 LAB
ALBUMIN SERPL-MCNC: 2.3 G/DL (ref 3.5–5)
ALBUMIN/GLOB SERPL: 0.6 {RATIO} (ref 1.1–2.2)
ALP SERPL-CCNC: 107 U/L (ref 45–117)
ALT SERPL-CCNC: 9 U/L (ref 12–78)
ANION GAP SERPL CALC-SCNC: 5 MMOL/L (ref 5–15)
AST SERPL-CCNC: 22 U/L (ref 15–37)
BASOPHILS # BLD: 0.1 K/UL (ref 0–0.1)
BASOPHILS NFR BLD: 1 % (ref 0–1)
BILIRUB SERPL-MCNC: 0.9 MG/DL (ref 0.2–1)
BUN SERPL-MCNC: 12 MG/DL (ref 6–20)
BUN/CREAT SERPL: 13 (ref 12–20)
CALCIUM SERPL-MCNC: 7.9 MG/DL (ref 8.5–10.1)
CHLORIDE SERPL-SCNC: 103 MMOL/L (ref 97–108)
CO2 SERPL-SCNC: 24 MMOL/L (ref 21–32)
CREAT SERPL-MCNC: 0.93 MG/DL (ref 0.7–1.3)
DIFFERENTIAL METHOD BLD: ABNORMAL
EOSINOPHIL # BLD: 0.7 K/UL (ref 0–0.4)
EOSINOPHIL NFR BLD: 10 % (ref 0–7)
ERYTHROCYTE [DISTWIDTH] IN BLOOD BY AUTOMATED COUNT: 12.7 % (ref 11.5–14.5)
FERRITIN SERPL-MCNC: 146 NG/ML (ref 26–388)
GLOBULIN SER CALC-MCNC: 3.9 G/DL (ref 2–4)
GLUCOSE SERPL-MCNC: 102 MG/DL (ref 65–100)
HCT VFR BLD AUTO: 38.6 % (ref 36.6–50.3)
HGB BLD-MCNC: 13 G/DL (ref 12.1–17)
IMM GRANULOCYTES # BLD AUTO: 0 K/UL (ref 0–0.04)
IMM GRANULOCYTES NFR BLD AUTO: 0 % (ref 0–0.5)
INR PPP: 1.2 (ref 0.9–1.1)
IRON SATN MFR SERPL: 21 % (ref 20–50)
IRON SERPL-MCNC: 41 UG/DL (ref 35–150)
LYMPHOCYTES # BLD: 0.9 K/UL (ref 0.8–3.5)
LYMPHOCYTES NFR BLD: 11 % (ref 12–49)
MCH RBC QN AUTO: 31.2 PG (ref 26–34)
MCHC RBC AUTO-ENTMCNC: 33.7 G/DL (ref 30–36.5)
MCV RBC AUTO: 92.6 FL (ref 80–99)
MONOCYTES # BLD: 0.7 K/UL (ref 0–1)
MONOCYTES NFR BLD: 9 % (ref 5–13)
NEUTS SEG # BLD: 5.3 K/UL (ref 1.8–8)
NEUTS SEG NFR BLD: 69 % (ref 32–75)
NRBC # BLD: 0 K/UL (ref 0–0.01)
NRBC BLD-RTO: 0 PER 100 WBC
PLATELET # BLD AUTO: 402 K/UL (ref 150–400)
PMV BLD AUTO: 9.4 FL (ref 8.9–12.9)
POTASSIUM SERPL-SCNC: 4.2 MMOL/L (ref 3.5–5.1)
PROT SERPL-MCNC: 6.2 G/DL (ref 6.4–8.2)
PROTHROMBIN TIME: 12.5 SEC (ref 9–11.1)
RBC # BLD AUTO: 4.17 M/UL (ref 4.1–5.7)
SODIUM SERPL-SCNC: 132 MMOL/L (ref 136–145)
TIBC SERPL-MCNC: 195 UG/DL (ref 250–450)
WBC # BLD AUTO: 7.6 K/UL (ref 4.1–11.1)

## 2021-01-20 PROCEDURE — 85025 COMPLETE CBC W/AUTO DIFF WBC: CPT

## 2021-01-20 PROCEDURE — 99218 HC RM OBSERVATION: CPT

## 2021-01-20 PROCEDURE — 83540 ASSAY OF IRON: CPT

## 2021-01-20 PROCEDURE — 80053 COMPREHEN METABOLIC PANEL: CPT

## 2021-01-20 PROCEDURE — 82390 ASSAY OF CERULOPLASMIN: CPT

## 2021-01-20 PROCEDURE — 74011250637 HC RX REV CODE- 250/637: Performed by: INTERNAL MEDICINE

## 2021-01-20 PROCEDURE — 36415 COLL VENOUS BLD VENIPUNCTURE: CPT

## 2021-01-20 PROCEDURE — 82728 ASSAY OF FERRITIN: CPT

## 2021-01-20 PROCEDURE — 85610 PROTHROMBIN TIME: CPT

## 2021-01-20 RX ADMIN — Medication 100 MG: at 08:49

## 2021-01-20 RX ADMIN — Medication 1 TABLET: at 08:49

## 2021-01-20 RX ADMIN — FAMOTIDINE 20 MG: 20 TABLET, FILM COATED ORAL at 08:49

## 2021-01-20 RX ADMIN — FOLIC ACID 1 MG: 1 TABLET ORAL at 08:49

## 2021-01-20 NOTE — PROGRESS NOTES
Bedside and Verbal shift change report given to BULL Kirby (oncoming nurse) by Verna Stevens RN (offgoing nurse). Report included the following information SBAR, Intake/Output, MAR and Recent Results.

## 2021-01-20 NOTE — DISCHARGE INSTRUCTIONS
Patient Discharge Instructions    Adrian Martinez / [id number] : 1975    Admitted 2021 Discharged: 2021     Primary Diagnoses  Problem List as of 2021 Never Reviewed           * (Principal) Ascites   Alcohol abuse          Take Home Medications     · It is important that you take the medication exactly as they are prescribed. · Keep your medication in the bottles provided by the pharmacist and keep a list of the medication names, dosages, and times to be taken in your wallet. · Do not take other medications without consulting your doctor. What to do at Home    Recommended diet: Regular Diet    Recommended activity: Activity as tolerated    If you experience worse symptoms, please follow up with [facility] ambulatory clinic. Follow-up with [facility] clinic in a few days    Patient Education        Cirrosis: Instrucciones de cuidado  Cirrhosis: Care Instructions  Instrucciones de cuidado    La cirrosis ocurre cuando el tejido cait del hígado es reemplazado por tejido cicatricial. [place] impide el buen funcionamiento del hígado. Por lo general, aparece después de que un hígado baer estado inflamado por años. La cirrosis es causada la mayoría de las veces por el consumo excesivo de alcohol o por william infección por hepatitis. Willam también hay otras causas. Estas incluyen medicamentos y [place] grasa en el hígado. También puede ser causada por afecciones hereditarias y otros trastornos. En algunos casos, la causa no se encuentra. El tratamiento no puede reparar completamente el daño hepático. Willam fred vez pueda retardar o prevenir más daño si no jack alcohol ni [place] drogas que dañen el hígado. La atención de seguimiento es william parte clave de lyn tratamiento y seguridad. Asegúrese de hacer y acudir a todas las citas, y llame a lyn médico si está teniendo problemas. También es william buena idea saber los resultados de vesna exámenes y mantener william lista de los medicamentos que glory.   ¿Cómo puede cuidarse en el hogar?  · No silverio nada de alcohol. Puede dañarle el hígado. Hable con lyn médico si necesita ayuda para dejar de beber alcohol.  · Sea preeti con los medicamentos. [place] los medicamentos exactamente ara le fueron recetados. Llame a lyn médico si rebekah estar teniendo un problema con lyn medicamento.  · Hable con lyn médico antes de catherine cualquier otro medicamento. Se incluyen medicamentos de venta guero y productos herbarios.  · Tenga cuidado al catherine acetaminofén (Tylenol), ibuprofeno (Advil, Motrin) o naproxeno (Aleve). Estos a veces pueden causar más daño hepático. Hable con lyn médico si no está seguro de cuáles medicamentos son seguros.  · Si lyn cirrosis hace que se acumule exceso de líquido en lyn cuerpo, trate de no ingerir mucha sal. Use menos sal en la macias y al cocinar. No coma comida rápida ni refrigerios con mucha sal. El exceso de líquidos en el abdomen, las piernas y el pecho puede causarle problemas graves.  · Colabore con lyn médico o dietista para asegurarse de come la cantidad adecuada de carbohidratos, proteínas, grasa y sodio (sal). Es muy importante que elija los mejores alimentos para la tricia de lyn hígado.  · Si lyn médico se lo recomienda, limite la cantidad de líquido que jack.  · Si lyn médico se lo recomienda, connor más ejercicio. Caminar es william buena opción. Poco a poco, aumente la distancia que camina todos los días. Intente hacer por lo menos 30 minutos de ejercicio la mayoría de los días de la semana. Quizás también quiera nadar, montar en bicicleta o hacer otras actividades.  ¿Cuándo debe pedir ayuda?   Llame al 911 en cualquier momento que considere que necesita atención de urgencia. Por ejemplo, llame si:    · Se desmayó (perdió el conocimiento).   Llame a lyn médico ahora mismo o busque atención médica inmediata si:    · Se siente muy somnoliento o confuso.     · Tiene nuevo dolor abdominal o lyn dolor empeora.     · Tiene fiebre.     · Tiene un nuevo tinte amarillento o armen es más intenso  en la piel o en la parte racquel de los ojos.     · Tiene cualquier tipo de sangrado (hemorragia) anormal, ara:  ? Hemorragias nasales. ? Sangrado vaginal que es diferente (más intenso, más frecuente, en un momento diferente del mes) del cual está [place]. ? Heces sanguinolentas (con chance) o negruzcas, o sangrado rectal.  ? Orina sanguinolenta o de color rowe. Preste especial atención a los cambios en lyn tricia y asegúrese de comunicarse con lyn médico si tiene cualquier problema. ¿Dónde puede encontrar más información en inglés? Vaya a http://www.gray.com/  Escriba M412 en la búsqueda para aprender más acerca de \"Cirrosis: Instrucciones de cuidado. \"  Revisado: 15 michael, 2020               Versión del contenido: 12.6  © 8721-9153 Healthwise, Incorporated. Las instrucciones de cuidado fueron adaptadas bajo licencia por [employer] (which disclaims liability or warranty for this information). Si usted tiene [place] afección médica o sobre estas instrucciones, siempre pregunte a lyn profesional de tricia. Healthwise, Incorporated niega toda garantía o responsabilidad por lyn uso de esta información. Information obtained by :  I understand that if any problems occur once I am at home I am to contact my physician. I understand and acknowledge receipt of the instructions indicated above.                                                                                                                                            Physician's or R.N.'s Signature                                                                  Date/Time                                                                                                                                              Patient or Representative Signature                                                          Date/Time

## 2021-01-20 NOTE — PROGRESS NOTES
[facility]    Medical Progress Note      NAME: Jimmy Pepperland   :  1975  MRM:  [id number]    Date/Time of service 2021  9:56 AM          Assessment and Plan:     Ascites - POA, alcoholic cirrhosis is most likely etiology. Labs do not support dx of viral hepatitis, autoimmune and cancer. Consulted GI . IR did paracentesis. Diet resumed. Can DC home today with outpatient follow up     Alcohol abuse - POA, did not go into withdrawal. We gave him goody bag. Protein calorie malnutrition - POA due to alcoholism and cirrhosis. Diet and supplements as tolerated. Subjective:     Chief Complaint:  Feels better    ROS:  (bold if positive, if negative)    Tolerating PT  Tolerating Diet        Objective:     Last 24hrs VS reviewed since prior progress note.  Most recent are:    Visit Vitals  /71 (BP 1 Location: Right arm, BP Patient Position: At rest)   Pulse 79   Temp 98.4 °F (36.9 °C)   Resp 16   Wt 49 kg (108 lb 1.6 oz)   SpO2 98%     SpO2 Readings from Last 6 Encounters:   21 98%            Intake/Output Summary (Last 24 hours) at 2021 0956  Last data filed at 2021 0354  Gross per 24 hour   Intake 1730 ml   Output 05979 ml   Net -63384 ml        Physical Exam:    Gen:  Frail, thin, in no acute distress  HEENT:  Pink conjunctivae, PERRL, hearing intact to voice, moist mucous membranes  Neck:  Supple, without masses, thyroid non-tender  Resp:  No accessory muscle use, clear breath sounds without wheezes rales or rhonchi  Card:  No murmurs, normal S1, S2 without thrills, bruits or peripheral edema  Abd:  Soft, non-tender, non-distended, normoactive bowel sounds are present, no mass  Lymph:  No cervical or inguinal adenopathy  Musc:  No cyanosis or clubbing  Skin:  No rashes or ulcers, skin turgor is good  Neuro:  Cranial nerves are grossly intact, mild motor weakness, follows commands   Psych:  Good insight, oriented to person, place and time, alert    Telemetry reviewed:   normal sinus rhythm  __________________________________________________________________  Medications Reviewed: (see below)  Medications:     Current Facility-Administered Medications   Medication Dose Route Frequency    LORazepam (ATIVAN) injection 2 mg  2 mg IntraVENous Q1H PRN    LORazepam (ATIVAN) injection 4 mg  4 mg IntraVENous Q1H PRN    cefTRIAXone (ROCEPHIN) 1 g in sterile water (preservative free) 10 mL IV syringe  1 g IntraVENous Q24H    albumin human 25% (BUMINATE) solution 25 g  25 g IntraVENous RAD PRN    acetaminophen (TYLENOL) tablet 650 mg  650 mg Oral Q6H PRN    Or    acetaminophen (TYLENOL) suppository 650 mg  650 mg Rectal Q6H PRN    polyethylene glycol (MIRALAX) packet 17 g  17 g Oral DAILY PRN    ondansetron (ZOFRAN ODT) tablet 4 mg  4 mg Oral Q8H PRN    Or    ondansetron (ZOFRAN) injection 4 mg  4 mg IntraVENous Q6H PRN    famotidine (PEPCID) tablet 20 mg  20 mg Oral BID    dextrose 5% - 0.45% NaCl with KCl 20 mEq/L infusion  50 mL/hr IntraVENous CONTINUOUS    thiamine mononitrate (B-1) tablet 100 mg  100 mg Oral DAILY    folic acid (FOLVITE) tablet 1 mg  1 mg Oral DAILY    multivitamin with iron tablet 1 Tab  1 Tab Oral DAILY        Lab Data Reviewed: (see below)  Lab Review:     Recent Labs     01/20/21 0612 01/19/21  0656 01/18/21  0626   WBC 7.6 6.5 6.6   HGB 13.0 11.9* 13.2   HCT 38.6 36.2* 39.6   * 424* 445*     Recent Labs     01/20/21  0612 01/19/21  0656 01/18/21  0626   * 133* 133*   K 4.2 4.3 3.8    103 101   CO2 24 26 28   * 104* 106*   BUN 12 12 9   CREA 0.93 0.97 1.03   CA 7.9* 8.3* 8.6   MG  --  2.2  --    PHOS  --  4.0  --    ALB 2.3* 2.6* 2.7*   TBILI 0.9 0.9 0.8   ALT 9* 12 14   INR 1.2* 1.2* 1.2*     No results found for: GLUCPOC  No results for input(s): PH, PCO2, PO2, HCO3, FIO2 in the last 72 hours.   Recent Labs     01/20/21  0612 01/19/21  0656 01/18/21  0626   INR 1.2* 1.2* 1.2*     All Micro Results     Procedure Component Value Units Date/Time    CULTURE, BODY FLUID [[id number]] Collected: 01/19/21 1428    Order Status: Completed Specimen: Body Fluid from Ascitic Fluid Updated: 01/19/21 2128     Special Requests: NO SPECIAL REQUESTS        GRAM STAIN OCCASIONAL WBCS SEEN         NO ORGANISMS SEEN        Culture result: PENDING    AFB CULTURE & SMEAR W/REFLEX ID [[id number]] Collected: 01/19/21 1428    Order Status: Completed Updated: 01/19/21 1527    CULTURE, BODY FLUID [[id number]]     Order Status: Canceled Specimen: Body Fluid from Ascitic Fluid     AFB CULTURE & SMEAR W/REFLEX ID [[id number]]     Order Status: Canceled     URINE CULTURE HOLD SAMPLE [[id number]] Collected: 01/18/21 0841    Order Status: Completed Specimen: Serum Updated: 01/18/21 0845     Urine culture hold       Urine on hold in Microbiology dept for 2 days. If unpreserved urine is submitted, it cannot be used for addtional testing after 24 hours, recollection will be required. Other pertinent lab: none    Total time spent with patient: 35 Minutes I personally reviewed chart, notes, data and current medications in the medical record. I have personally examined and treated the patient at bedside during this period.                  Care Plan discussed with: Patient, Care Manager, Nursing Staff, Consultant/Specialist and >50% of time spent in counseling and coordination of care    Discussed:  Care Plan and D/C Planning    Prophylaxis:  H2B/PPI    Disposition:  Home w/Family           ___________________________________________________    Attending Physician: Jorge De La Paz MD

## 2021-01-20 NOTE — PROGRESS NOTES
Bedside and Verbal shift change report given to  Aruna Rahman, [address] [place] [address] (oncoming nurse) by  Raymundo Kaiser RN (offgoing nurse). Report included the following information SBAR, Kardex, Intake/Output, MAR, Accordion, Recent Results and Med Rec Status.

## 2021-01-20 NOTE — DISCHARGE SUMMARY
Physician Discharge Summary     Patient ID:  Jane Hooker  [id number]  51 y.o.  1975    Admit date: 1/18/2021    Discharge date of service and time: 1/20/2021    Admission Diagnoses: Ascites [R18.8]    Discharge Diagnoses:    Principal Diagnosis   Ascites                                             Hospital Course and other diagnoses  Ascites - POA, alcoholic cirrhosis is most likely etiology. Labs do not support dx of viral hepatitis, autoimmune and cancer.  Consulted GI . IR did paracentesis. Diet resumed. Can DC home today with outpatient follow up     Alcohol abuse - POA, did not go into withdrawal. We gave him goody bag.     Protein calorie malnutrition - POA due to alcoholism and cirrhosis. Diet and supplements as tolerated.     PCP: None    Consults: GI    Significant Diagnostic Studies: See Hospital Course    Discharged home in improved condition.     Discharge Exam:  /71 (BP 1 Location: Right arm, BP Patient Position: At rest)   Pulse 79   Temp 98.4 °F (36.9 °C)   Resp 16   Wt 49 kg (108 lb 1.6 oz)   SpO2 98%      Gen:  Frail, thin, in no acute distress  HEENT:  Pink conjunctivae, PERRL, hearing intact to voice, moist mucous membranes  Neck:  Supple, without masses, thyroid non-tender  Resp:  No accessory muscle use, clear breath sounds without wheezes rales or rhonchi  Card:  No murmurs, normal S1, S2 without thrills, bruits or peripheral edema  Abd:  Soft, non-tender, non-distended, normoactive bowel sounds are present, no mass  Lymph:  No cervical or inguinal adenopathy  Musc:  No cyanosis or clubbing  Skin:  No rashes or ulcers, skin turgor is good  Neuro:  Cranial nerves are grossly intact, mild motor weakness, follows commands   Psych:  Good insight, oriented to person, place and time, alert    Patient Instructions:   Current Discharge Medication List      CONTINUE these medications which have NOT CHANGED    Details   acetaminophen (TYLENOL) 325 mg tablet Take 650 mg by mouth every six (6) hours as needed for Pain. Activity: Activity as tolerated  Diet: Regular Diet and add supplements  Wound Care: None needed    Follow-up with any PCP in a few weeks.   Follow-up tests/labs - none    Signed:  Guero Angel MD  1/20/2021  10:02 AM

## 2021-01-21 LAB — CERULOPLASMIN SERPL-MCNC: 20.1 MG/DL (ref 16–31)

## 2021-01-23 LAB
BACTERIA SPEC CULT: NORMAL
GRAM STN SPEC: NORMAL
GRAM STN SPEC: NORMAL
SERVICE CMNT-IMP: NORMAL

## 2021-01-30 ENCOUNTER — HOSPITAL ENCOUNTER (EMERGENCY)
Age: 46
Discharge: HOME | End: 2021-01-30
Attending: EMERGENCY MEDICINE

## 2021-01-30 VITALS
RESPIRATION RATE: 18 BRPM | HEIGHT: 65 IN | WEIGHT: 147 LBS | BODY MASS INDEX: 24.49 KG/M2 | DIASTOLIC BLOOD PRESSURE: 82 MMHG | OXYGEN SATURATION: 100 % | HEART RATE: 88 BPM | TEMPERATURE: 98.1 F | SYSTOLIC BLOOD PRESSURE: 108 MMHG

## 2021-01-30 DIAGNOSIS — K70.31 ASCITES DUE TO ALCOHOLIC CIRRHOSIS (HCC): Primary | ICD-10-CM

## 2021-01-30 PROCEDURE — 99283 EMERGENCY DEPT VISIT LOW MDM: CPT

## 2021-01-30 NOTE — LETTER
[address] 
[facility] EMERGENCY DEPT 
[address] [place] [id number] [phone] Work/School Note Date: 1/30/2021 To Whom It May concern: 
 
Gaviota Hadnley was seen and treated today in the emergency room by the following provider(s): 
Attending Provider: Helen Sahu MD 
Nurse Practitioner: Ryan Shannon NP. Gaviota Handley may return to work on 2/3/2021. Sincerely, Sridevi Bo NP

## 2021-01-30 NOTE — LETTER
[facility] 
[facility] EMERGENCY DEPT 
[address] [facility] [facility] [place] [address] [phone] Work/School Note Date: 1/30/2021 To Whom It May concern: 
 
Aga Beltrán was seen and treated today in the emergency room by the following provider(s): 
Attending Provider: Tania Logan MD 
Nurse Practitioner: Olvin Choudhary NP. Aga Beltrán may return to work on 2/2/2021. Sincerely, Davon Blood NP

## 2021-01-30 NOTE — DISCHARGE INSTRUCTIONS
We have made an appointment for you to get a paracentesis on Tuesday at 9 AM.  Please go to the radiology waiting room at [place] [address].

## 2021-01-30 NOTE — ED TRIAGE NOTES
Pt recently dx'ed with cirrhosis and currently has ascites. Pt speaks and understands little [place].

## 2021-01-30 NOTE — ED PROVIDER NOTES
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 According the patient, he was recently admitted to the hospital for the work-up of ascites that have been progressing for approximately 1 month in duration. He underwent testing and was told that he has cirrhosis likely from prior alcohol abuse. He reports receiving a diagnostic paracentesis and felt much better prior to being discharged. He was instructed to follow-up with [facility] for further management. He stated that he was not prescribed any medications at the end of his hospital admission. He states that over the last several days he has had rapidly progressing abdominal distention and abdominal pain that is nonradiating in nature. He also reports nausea, without vomiting and a sensation of constipation. ROS negative for: Chest pain, shortness of breath, urinary complaints, fever/chills, melena. No past medical history on file. No past surgical history on file. No family history on file.     Social History     Socioeconomic History    Marital status:      Spouse name: Not on file    Number of children: Not on file    Years of education: Not on file    Highest education level: Not on file   Occupational History    Not on file   Social Needs    Financial resource strain: Not on file    Food insecurity     Worry: Not on file     Inability: Not on file    Transportation needs     Medical: Not on file     Non-medical: Not on file   Tobacco Use    Smoking status: Not on file   Substance and Sexual Activity    Alcohol use: Not on file    Drug use: Not on file    Sexual activity: Not on file   Lifestyle    Physical activity     Days per week: Not on file     Minutes per session: Not on file    Stress: Not on file   Relationships    Social connections     Talks on phone: Not on file     Gets together: Not on file     Attends Druze service: Not on file     Active member of club or organization: Not on file     Attends meetings of clubs or organizations: Not on file     Relationship status: Not on file   • Intimate partner violence     Fear of current or ex partner: Not on file     Emotionally abused: Not on file     Physically abused: Not on file     Forced sexual activity: Not on file   Other Topics Concern   • Not on file   Social History Narrative   • Not on file         ALLERGIES: Patient has no known allergies.    Review of Systems   Constitutional: Negative for chills and fever.   HENT: Negative for sore throat.    Eyes: Negative for visual disturbance.   Respiratory: Negative for shortness of breath.    Cardiovascular: Negative for palpitations.   Gastrointestinal: Positive for abdominal distention, abdominal pain, constipation and nausea. Negative for diarrhea and vomiting.   Genitourinary: Negative for dysuria and scrotal swelling.   Musculoskeletal: Negative for myalgias.   Skin: Negative for rash.   Neurological: Negative for dizziness.   Psychiatric/Behavioral: Negative for dysphoric mood.       Vitals:    01/30/21 1608   BP: 108/82   Pulse: 88   Resp: 18   Temp: 98.1 °F (36.7 °C)   SpO2: 100%   Weight: 66.7 kg (147 lb)   Height: 5' 5\" (1.651 m)            Physical Exam  Vitals signs and nursing note reviewed.   Constitutional:       General: He is not in acute distress.     Appearance: He is cachectic. He is not ill-appearing or toxic-appearing.   HENT:      Head: Normocephalic and atraumatic.      Nose: Nose normal.      Mouth/Throat:      Mouth: Mucous membranes are dry.      Pharynx: Oropharynx is clear.   Eyes:      Extraocular Movements: Extraocular movements intact.   Neck:      Musculoskeletal: Normal range of motion and neck supple.   Cardiovascular:      Rate and Rhythm: Normal rate and regular rhythm.      Pulses: Normal pulses.      Heart sounds: Normal heart sounds.  Pulmonary:      Effort: Pulmonary effort is normal.      Breath sounds: Normal breath sounds. Abdominal:      General: Bowel sounds are decreased. There is distension. Tenderness: There is generalized abdominal tenderness. There is no right CVA tenderness or left CVA tenderness. Musculoskeletal: Normal range of motion. Skin:     General: Skin is warm and dry. Neurological:      Mental Status: He is alert and oriented to person, place, and time. Mental status is at baseline. Psychiatric:         Mood and Affect: Mood normal.         Behavior: Behavior normal.          MDM     VITAL SIGNS:  Patient Vitals for the past 4 hrs:   Temp Pulse Resp BP SpO2   01/30/21 1608 98.1 °F (36.7 °C) 88 18 108/82 100 %         LABS:  No results found for this or any previous visit (from the past 6 hour(s)). IMAGING:  US PARACENTESIS ABD W IMAGING    (Results Pending)         Medications During Visit:  Medications - No data to display      DECISION MAKING:  Aga Beltrán is a 51 y.o. male who comes in as above. Medication for emergent paracentesis. Symptoms seem to be ongoing and progressive since his last hospital discharge, for which he was told to follow-up with [facility] but did not. He does have marked ascites and bulging flanks, but there is no evidence of tense ascites. I was able to arrange a therapeutic tap on Tuesday at 9 AM at this facility. Patient given instructions for when/where to return for the procedure. Above results discussed and reviewed with the patient. Patient verbalized understanding of the care plan, including any changes to current outpatient medication regimen, discussed disease process, symptom control, and follow-up care. IMPRESSION:  1.  Ascites due to alcoholic cirrhosis [facility])        DISPOSITION:  Discharged      Current Discharge Medication List           Follow-up Information    None           The patient is asked to follow-up with their primary care provider in the next several days. They are to call tomorrow for an appointment. The patient is asked to return promptly for any increased concerns or worsening of symptoms. They can return to this emergency department or any other emergency department.

## 2021-02-02 ENCOUNTER — HOSPITAL ENCOUNTER (OUTPATIENT)
Dept: ULTRASOUND IMAGING | Age: 46
Discharge: HOME | End: 2021-02-02
Attending: NURSE PRACTITIONER

## 2021-02-02 ENCOUNTER — HOSPITAL ENCOUNTER (EMERGENCY)
Age: 46
Discharge: HOME | End: 2021-02-02
Attending: EMERGENCY MEDICINE

## 2021-02-02 VITALS
RESPIRATION RATE: 16 BRPM | SYSTOLIC BLOOD PRESSURE: 90 MMHG | OXYGEN SATURATION: 99 % | HEART RATE: 77 BPM | DIASTOLIC BLOOD PRESSURE: 55 MMHG

## 2021-02-02 VITALS
HEIGHT: 63 IN | TEMPERATURE: 98.1 F | RESPIRATION RATE: 18 BRPM | HEART RATE: 79 BPM | WEIGHT: 145 LBS | BODY MASS INDEX: 25.69 KG/M2 | DIASTOLIC BLOOD PRESSURE: 92 MMHG | OXYGEN SATURATION: 97 % | SYSTOLIC BLOOD PRESSURE: 113 MMHG

## 2021-02-02 DIAGNOSIS — R10.817 GENERALIZED ABDOMINAL TENDERNESS WITHOUT REBOUND TENDERNESS: ICD-10-CM

## 2021-02-02 DIAGNOSIS — K70.31 ALCOHOLIC CIRRHOSIS OF LIVER WITH ASCITES (HCC): ICD-10-CM

## 2021-02-02 DIAGNOSIS — R14.0 ABDOMINAL DISTENTION: ICD-10-CM

## 2021-02-02 DIAGNOSIS — K70.31 ASCITES DUE TO ALCOHOLIC CIRRHOSIS (HCC): Primary | ICD-10-CM

## 2021-02-02 LAB
ALBUMIN SERPL-MCNC: 2.4 G/DL (ref 3.5–5)
ALBUMIN/GLOB SERPL: 0.5 {RATIO} (ref 1.1–2.2)
ALP SERPL-CCNC: 176 U/L (ref 45–117)
ALT SERPL-CCNC: 22 U/L (ref 12–78)
ANION GAP SERPL CALC-SCNC: 6 MMOL/L (ref 5–15)
AST SERPL-CCNC: 42 U/L (ref 15–37)
BASOPHILS # BLD: 0.1 K/UL (ref 0–0.1)
BASOPHILS NFR BLD: 1 % (ref 0–1)
BILIRUB DIRECT SERPL-MCNC: 0.1 MG/DL (ref 0–0.2)
BILIRUB SERPL-MCNC: 0.6 MG/DL (ref 0.2–1)
BUN SERPL-MCNC: 13 MG/DL (ref 6–20)
BUN/CREAT SERPL: 13 (ref 12–20)
CALCIUM SERPL-MCNC: 8.6 MG/DL (ref 8.5–10.1)
CHLORIDE SERPL-SCNC: 105 MMOL/L (ref 97–108)
CO2 SERPL-SCNC: 26 MMOL/L (ref 21–32)
CREAT SERPL-MCNC: 0.98 MG/DL (ref 0.7–1.3)
DIFFERENTIAL METHOD BLD: ABNORMAL
EOSINOPHIL # BLD: 0.8 K/UL (ref 0–0.4)
EOSINOPHIL NFR BLD: 8 % (ref 0–7)
ERYTHROCYTE [DISTWIDTH] IN BLOOD BY AUTOMATED COUNT: 13.1 % (ref 11.5–14.5)
ETHANOL SERPL-MCNC: <10 MG/DL
GLOBULIN SER CALC-MCNC: 5.2 G/DL (ref 2–4)
GLUCOSE SERPL-MCNC: 117 MG/DL (ref 65–100)
HCT VFR BLD AUTO: 41.7 % (ref 36.6–50.3)
HGB BLD-MCNC: 13.4 G/DL (ref 12.1–17)
IMM GRANULOCYTES # BLD AUTO: 0.1 K/UL (ref 0–0.04)
IMM GRANULOCYTES NFR BLD AUTO: 1 % (ref 0–0.5)
INR PPP: 1.1 (ref 0.9–1.1)
LACTATE BLD-SCNC: 1.05 MMOL/L (ref 0.4–2)
LYMPHOCYTES # BLD: 1.1 K/UL (ref 0.8–3.5)
LYMPHOCYTES NFR BLD: 11 % (ref 12–49)
MCH RBC QN AUTO: 30.2 PG (ref 26–34)
MCHC RBC AUTO-ENTMCNC: 32.1 G/DL (ref 30–36.5)
MCV RBC AUTO: 94.1 FL (ref 80–99)
MONOCYTES # BLD: 0.7 K/UL (ref 0–1)
MONOCYTES NFR BLD: 7 % (ref 5–13)
NEUTS SEG # BLD: 6.8 K/UL (ref 1.8–8)
NEUTS SEG NFR BLD: 72 % (ref 32–75)
NRBC # BLD: 0 K/UL (ref 0–0.01)
NRBC BLD-RTO: 0 PER 100 WBC
PLATELET # BLD AUTO: 474 K/UL (ref 150–400)
PMV BLD AUTO: 9.3 FL (ref 8.9–12.9)
POTASSIUM SERPL-SCNC: 4.1 MMOL/L (ref 3.5–5.1)
PROT SERPL-MCNC: 7.6 G/DL (ref 6.4–8.2)
PROTHROMBIN TIME: 10.9 SEC (ref 9–11.1)
RBC # BLD AUTO: 4.43 M/UL (ref 4.1–5.7)
SODIUM SERPL-SCNC: 137 MMOL/L (ref 136–145)
WBC # BLD AUTO: 9.5 K/UL (ref 4.1–11.1)

## 2021-02-02 PROCEDURE — P9047 ALBUMIN (HUMAN), 25%, 50ML: HCPCS | Performed by: STUDENT IN AN ORGANIZED HEALTH CARE EDUCATION/TRAINING PROGRAM

## 2021-02-02 PROCEDURE — 99284 EMERGENCY DEPT VISIT MOD MDM: CPT

## 2021-02-02 PROCEDURE — 82077 ASSAY SPEC XCP UR&BREATH IA: CPT

## 2021-02-02 PROCEDURE — 85025 COMPLETE CBC W/AUTO DIFF WBC: CPT

## 2021-02-02 PROCEDURE — 74011250636 HC RX REV CODE- 250/636: Performed by: STUDENT IN AN ORGANIZED HEALTH CARE EDUCATION/TRAINING PROGRAM

## 2021-02-02 PROCEDURE — 77030038620

## 2021-02-02 PROCEDURE — 80076 HEPATIC FUNCTION PANEL: CPT

## 2021-02-02 PROCEDURE — 77030018870 HC TY PARCNT BD -B

## 2021-02-02 PROCEDURE — 80048 BASIC METABOLIC PNL TOTAL CA: CPT

## 2021-02-02 PROCEDURE — 83605 ASSAY OF LACTIC ACID: CPT

## 2021-02-02 PROCEDURE — 77030041470 HC TBNG SET PARA GISP -B

## 2021-02-02 PROCEDURE — 49083 ABD PARACENTESIS W/IMAGING: CPT

## 2021-02-02 PROCEDURE — 74011000250 HC RX REV CODE- 250: Performed by: STUDENT IN AN ORGANIZED HEALTH CARE EDUCATION/TRAINING PROGRAM

## 2021-02-02 PROCEDURE — 85610 PROTHROMBIN TIME: CPT

## 2021-02-02 RX ORDER — LIDOCAINE HYDROCHLORIDE 10 MG/ML
10 INJECTION, SOLUTION EPIDURAL; INFILTRATION; INTRACAUDAL; PERINEURAL
Status: COMPLETED | OUTPATIENT
Start: 2021-02-02 | End: 2021-02-02

## 2021-02-02 RX ORDER — ALBUMIN HUMAN 250 G/1000ML
25 SOLUTION INTRAVENOUS
Status: DISCONTINUED | OUTPATIENT
Start: 2021-02-02 | End: 2021-02-02

## 2021-02-02 RX ORDER — LIDOCAINE HYDROCHLORIDE AND EPINEPHRINE 10; 10 MG/ML; UG/ML
1.5 INJECTION, SOLUTION INFILTRATION; PERINEURAL AS NEEDED
Status: DISCONTINUED | OUTPATIENT
Start: 2021-02-02 | End: 2021-02-02

## 2021-02-02 RX ADMIN — ALBUMIN (HUMAN) 25 G: 0.25 INJECTION, SOLUTION INTRAVENOUS at 10:24

## 2021-02-02 RX ADMIN — LIDOCAINE HYDROCHLORIDE 10 ML: 10 INJECTION, SOLUTION EPIDURAL; INFILTRATION; INTRACAUDAL; PERINEURAL at 09:50

## 2021-02-02 RX ADMIN — ALBUMIN (HUMAN) 25 G: 0.25 INJECTION, SOLUTION INTRAVENOUS at 10:04

## 2021-02-02 NOTE — ED NOTES
Dr. Eliseo Payne and reviewed discharge instructions with the patient. The patient verbalized understanding. The patient was given opportunity for questions. Patient discharged in stable condition to the waiting room via ambulatory.

## 2021-02-02 NOTE — ED PROVIDER NOTES
48-year-old gentleman with a recent diagnosis of ascites likely due to alcohol abuse presents to the emergency department today with chief complaint of abdominal pain. He was admitted to the hospital in the middle of January for new onset ascites which is felt to be likely due to alcohol abuse. He denies any new fevers or nausea or vomiting. He complains of abdominal pain and distention. He has not followed up with [facility] or with gastroenterology. The history is provided by the patient and medical records. A  was used. Abdominal Pain   This is a recurrent problem. The current episode started more than 1 week ago. The problem occurs constantly. The problem has been gradually worsening. The pain is located in the generalized abdominal region. The quality of the pain is dull. The pain is moderate. Pertinent negatives include no fever, no diarrhea, no melena, no nausea, no vomiting, no dysuria, no frequency, no headaches, no arthralgias, no myalgias, no trauma, no chest pain and no back pain. No past medical history on file. No past surgical history on file. No family history on file.     Social History     Socioeconomic History    Marital status:      Spouse name: Not on file    Number of children: Not on file    Years of education: Not on file    Highest education level: Not on file   Occupational History    Not on file   Social Needs    Financial resource strain: Not on file    Food insecurity     Worry: Not on file     Inability: Not on file    Transportation needs     Medical: Not on file     Non-medical: Not on file   Tobacco Use    Smoking status: Not on file   Substance and Sexual Activity    Alcohol use: Not on file    Drug use: Not on file    Sexual activity: Not on file   Lifestyle    Physical activity     Days per week: Not on file     Minutes per session: Not on file    Stress: Not on file   Relationships    Social connections     Talks on phone: Not on file     Gets together: Not on file     Attends Oriental orthodox service: Not on file     Active member of club or organization: Not on file     Attends meetings of clubs or organizations: Not on file     Relationship status: Not on file    Intimate partner violence     Fear of current or ex partner: Not on file     Emotionally abused: Not on file     Physically abused: Not on file     Forced sexual activity: Not on file   Other Topics Concern    Not on file   Social History Narrative    Not on file         ALLERGIES: Patient has no known allergies. Review of Systems   Constitutional: Negative for fatigue and fever. HENT: Negative for sneezing and sore throat. Respiratory: Negative for cough and shortness of breath. Cardiovascular: Negative for chest pain and leg swelling. Gastrointestinal: Positive for abdominal distention and abdominal pain. Negative for diarrhea, melena, nausea and vomiting. Genitourinary: Negative for difficulty urinating, dysuria and frequency. Musculoskeletal: Negative for arthralgias, back pain and myalgias. Skin: Negative for color change and rash. Neurological: Negative for weakness and headaches. Psychiatric/Behavioral: Negative for agitation and behavioral problems. There were no vitals filed for this visit. Physical Exam  Vitals signs and nursing note reviewed. Constitutional:       General: He is not in acute distress. Appearance: He is ill-appearing. He is not toxic-appearing or diaphoretic. HENT:      Head: Normocephalic and atraumatic. Nose: Nose normal.      Mouth/Throat:      Mouth: Mucous membranes are moist.      Pharynx: Oropharynx is clear. Eyes:      General: Scleral icterus present. Extraocular Movements: Extraocular movements intact. Pupils: Pupils are equal, round, and reactive to light. Neck:      Musculoskeletal: Normal range of motion and neck supple. No muscular tenderness.    Cardiovascular:      Rate and Rhythm: Normal rate and regular rhythm. Pulses: Normal pulses. Heart sounds: Normal heart sounds. Pulmonary:      Effort: Pulmonary effort is normal. No respiratory distress. Breath sounds: Normal breath sounds. Abdominal:      General: There is distension. Tenderness: There is abdominal tenderness. There is no guarding or rebound. Musculoskeletal: Normal range of motion. General: No swelling, tenderness, deformity or signs of injury. Right lower leg: No edema. Left lower leg: No edema. Skin:     General: Skin is warm and dry. Capillary Refill: Capillary refill takes less than 2 seconds. Neurological:      General: No focal deficit present. Mental Status: He is alert and oriented to person, place, and time. Psychiatric:         Mood and Affect: Mood normal.         Behavior: Behavior normal.          MDM  Number of Diagnoses or Management Options  Abdominal distention  Alcoholic cirrhosis of liver with ascites (HCC)  Ascites due to alcoholic cirrhosis (HCC)  Generalized abdominal tenderness without rebound tenderness  Diagnosis management comments: 48-year-old male with recent diagnosis of alcoholic cirrhosis and ascites presents to the emergency department with worsening pain and distention. His labs appear to be his new baseline. He has not yet followed up with gastroenterology. On review of his chart he has an appointment with radiology pending this morning for IR guided paracentesis. Plan to discharge with instructions for him to go immediately to the radiology waiting room and check in as well as with instructions to follow-up with hepatologist today.        Amount and/or Complexity of Data Reviewed  Clinical lab tests: reviewed           Procedures

## 2021-02-02 NOTE — Clinical Note
Thank you for allowing us to provide you with medical care today. We realize that you have many choices for your emergency care needs. We thank you for choosing [facility]. Please choose us in the future for any continued health care needs. We  hope we addressed all of your medical concerns. We strive to provide excellent quality care in the Emergency Department. Anything less than excellent does not meet our expectations. The exam and treatment you received in the Emergency Department w ere for an emergent problem and are not intended as complete care. It is important that you follow up with a doctor, nurse practitioner, or physician's assistant for ongoing care. If your symptoms worsen or you do not improve as expected and you are  unable to reach your usual health care provider, you should return to the Emergency Department. We are available 24 hours a day. Take this sheet with you when you go to your follow-up visit. If you have any problem arranging the follow-up visit,  contact the Emergency Department immediately. Make an appointment your family doctor for follow up of this visit. Return to the ER if you are unable to be seen in a timely manner.

## 2021-02-02 NOTE — PROGRESS NOTES
Pt arrived to waiting area from ED, was scheduled for OP paracentesis this AM but was brought from ED over to waiting area. Met patient in [address], vitals stable.  used. Dr Sushant Yuan for consent. Pt does not have a ride since he came to the ED, per Dr Sushant Yuan, pt is OK to drive himself or wait in waiting area for ride if stable, and plan on having transportation next time. Pt does not have PCP but was seen as inpatient a few weeks ago, advised pt to follow up with [address] [place] [address] as per recommendation of Dr Kamara Client while here. Given contact info. IV started in L arm, timeout at 0946, start time 0947. Stop time [id number], pt tolerated procedure well, total of 10.4L of clear yellow fluid removed from r side abdomen. 50g albumin given. IV removed, orthotstatics stable. Clean dressing applied to pt's right side. D/C directions given to patient. Pt wheeled to waiting area and states his ride will be here around noon. Reinforced f/u with [facility] or establishing a  PCP.

## 2021-02-02 NOTE — DISCHARGE INSTRUCTIONS
Patient Education        Learning About Paracentesis  What is paracentesis? Paracentesis (say \"cwxb-vm-[id number]-CHELI-vesna\") is a procedure that removes fluid from the belly. The buildup of fluid may be caused by infection, inflammation, an injury, or other problems. Swelling from too much fluid may cause pain or trouble breathing. The doctor will remove the extra fluid with a needle attached to a tube. Why is paracentesis done? Paracentesis may be done to:  · Find the cause of fluid buildup in the belly. · Diagnose an infection in the peritoneal fluid. · Check for certain types of cancer. · Remove a large amount of fluid that is causing pain or trouble breathing or that is affecting how the kidneys or the intestines (bowel) are working. · Check for damage after a belly injury. How is the procedure done? You will empty your bladder before the procedure. Your doctor or nurse will clean the area of your belly where the needle will go in. Then he or she will put sterile towels around the area. You may get a shot of numbing medicine in the skin of your belly. Then your doctor will gently insert a needle where the fluid is. He or she may attach a tube (catheter) to the site to help collect the fluid. After the fluid has drained, your doctor will take out the needle or catheter and put a bandage on the site. How long does a paracentesis take? The procedure may take from a few minutes to 30 minutes or more. What happens after the test?  You can do your normal activities after the procedure, unless your doctor tells you not to. After the procedure, you may have some clear fluid draining from the site, especially if a large amount of fluid was taken out. There will be less drainage in 1 to 2 days. Ask your doctor how much drainage to expect. A small gauze pad and bandage may be needed. You may need to change the bandage.   If your doctor thinks that testing the fluid can help find the cause of a problem, he or she will send it to a lab. If fluid builds up in your belly again, your doctor may repeat this procedure. When should you call for help? Call 911 anytime you think you may need emergency care. For example, call if:  · You passed out (lost consciousness). Call your doctor now or seek immediate medical care if:  · You have symptoms of infection, such as:  ? Increased pain, swelling, warmth, or redness. ? Red streaks or pus. ? A fever. · You are dizzy or lightheaded, or you feel like you may faint. · You have new or worse belly pain. Watch closely for changes in your health, and be sure to contact your doctor if:  · Fluid builds up in your belly again. · You do not get better as expected. Where can you learn more? Go to http://www.gray.com/  Enter X447 in the search box to learn more about \"Learning About Paracentesis. \"  Current as of: April 15, 2020               Content Version: 12.6  © 2006-2020 [employer]. Care instructions adapted under license by [employer] (which disclaims liability or warranty for this information). If you have questions about a medical condition or this instruction, always ask your healthcare professional. [place] [address] any warranty or liability for your use of this information.

## 2021-02-02 NOTE — ED TRIAGE NOTES
Pt ambulatory to triage with mask, c/o abdominal pain since Saturday. Pt was recently seen in this ED for cirrhosis with ascites. Denies CP, SOB, fever, or N/V/D.  service utilized for this visit.

## 2021-02-12 ENCOUNTER — HOSPITAL ENCOUNTER (OUTPATIENT)
Age: 46
Setting detail: OBSERVATION
Discharge: HOME | End: 2021-02-13
Attending: STUDENT IN AN ORGANIZED HEALTH CARE EDUCATION/TRAINING PROGRAM | Admitting: INTERNAL MEDICINE

## 2021-02-12 ENCOUNTER — APPOINTMENT (OUTPATIENT)
Dept: ULTRASOUND IMAGING | Age: 46
End: 2021-02-12
Attending: STUDENT IN AN ORGANIZED HEALTH CARE EDUCATION/TRAINING PROGRAM

## 2021-02-12 DIAGNOSIS — I95.9 HYPOTENSION, UNSPECIFIED HYPOTENSION TYPE: Primary | ICD-10-CM

## 2021-02-12 DIAGNOSIS — K70.31 ASCITES DUE TO ALCOHOLIC CIRRHOSIS (HCC): ICD-10-CM

## 2021-02-12 PROBLEM — E87.1 HYPONATREMIA: Status: ACTIVE | Noted: 2021-02-12

## 2021-02-12 LAB
ALBUMIN SERPL-MCNC: 2.4 G/DL (ref 3.5–5)
ALBUMIN/GLOB SERPL: 0.5 {RATIO} (ref 1.1–2.2)
ALP SERPL-CCNC: 182 U/L (ref 45–117)
ALT SERPL-CCNC: 22 U/L (ref 12–78)
ANION GAP SERPL CALC-SCNC: 5 MMOL/L (ref 5–15)
AST SERPL-CCNC: 41 U/L (ref 15–37)
BASOPHILS # BLD: 0.1 K/UL (ref 0–0.1)
BASOPHILS NFR BLD: 1 % (ref 0–1)
BILIRUB SERPL-MCNC: 0.5 MG/DL (ref 0.2–1)
BUN SERPL-MCNC: 11 MG/DL (ref 6–20)
BUN/CREAT SERPL: 12 (ref 12–20)
CALCIUM SERPL-MCNC: 7.9 MG/DL (ref 8.5–10.1)
CHLORIDE SERPL-SCNC: 103 MMOL/L (ref 97–108)
CO2 SERPL-SCNC: 27 MMOL/L (ref 21–32)
COMMENT, HOLDF: NORMAL
CREAT SERPL-MCNC: 0.94 MG/DL (ref 0.7–1.3)
DIFFERENTIAL METHOD BLD: ABNORMAL
EOSINOPHIL # BLD: 0.4 K/UL (ref 0–0.4)
EOSINOPHIL NFR BLD: 6 % (ref 0–7)
ERYTHROCYTE [DISTWIDTH] IN BLOOD BY AUTOMATED COUNT: 13.2 % (ref 11.5–14.5)
GLOBULIN SER CALC-MCNC: 4.8 G/DL (ref 2–4)
GLUCOSE SERPL-MCNC: 101 MG/DL (ref 65–100)
HCT VFR BLD AUTO: 40.7 % (ref 36.6–50.3)
HGB BLD-MCNC: 13.3 G/DL (ref 12.1–17)
IMM GRANULOCYTES # BLD AUTO: 0 K/UL (ref 0–0.04)
IMM GRANULOCYTES NFR BLD AUTO: 0 % (ref 0–0.5)
INR PPP: 1 (ref 0.9–1.1)
LYMPHOCYTES # BLD: 0.9 K/UL (ref 0.8–3.5)
LYMPHOCYTES NFR BLD: 13 % (ref 12–49)
MCH RBC QN AUTO: 30.4 PG (ref 26–34)
MCHC RBC AUTO-ENTMCNC: 32.7 G/DL (ref 30–36.5)
MCV RBC AUTO: 93.1 FL (ref 80–99)
MONOCYTES # BLD: 0.7 K/UL (ref 0–1)
MONOCYTES NFR BLD: 10 % (ref 5–13)
NEUTS SEG # BLD: 4.7 K/UL (ref 1.8–8)
NEUTS SEG NFR BLD: 70 % (ref 32–75)
NRBC # BLD: 0 K/UL (ref 0–0.01)
NRBC BLD-RTO: 0 PER 100 WBC
PLATELET # BLD AUTO: 505 K/UL (ref 150–400)
PMV BLD AUTO: 8.7 FL (ref 8.9–12.9)
POTASSIUM SERPL-SCNC: 3.8 MMOL/L (ref 3.5–5.1)
PROT SERPL-MCNC: 7.2 G/DL (ref 6.4–8.2)
PROTHROMBIN TIME: 10.8 SEC (ref 9–11.1)
RBC # BLD AUTO: 4.37 M/UL (ref 4.1–5.7)
SAMPLES BEING HELD,HOLD: NORMAL
SODIUM SERPL-SCNC: 135 MMOL/L (ref 136–145)
WBC # BLD AUTO: 6.8 K/UL (ref 4.1–11.1)

## 2021-02-12 PROCEDURE — 80053 COMPREHEN METABOLIC PANEL: CPT

## 2021-02-12 PROCEDURE — P9047 ALBUMIN (HUMAN), 25%, 50ML: HCPCS | Performed by: STUDENT IN AN ORGANIZED HEALTH CARE EDUCATION/TRAINING PROGRAM

## 2021-02-12 PROCEDURE — 36415 COLL VENOUS BLD VENIPUNCTURE: CPT

## 2021-02-12 PROCEDURE — 77030038620

## 2021-02-12 PROCEDURE — 96366 THER/PROPH/DIAG IV INF ADDON: CPT

## 2021-02-12 PROCEDURE — 99218 HC RM OBSERVATION: CPT

## 2021-02-12 PROCEDURE — 85610 PROTHROMBIN TIME: CPT

## 2021-02-12 PROCEDURE — 77030041470 HC TBNG SET PARA GISP -B

## 2021-02-12 PROCEDURE — 99285 EMERGENCY DEPT VISIT HI MDM: CPT

## 2021-02-12 PROCEDURE — 74011250636 HC RX REV CODE- 250/636: Performed by: RADIOLOGY

## 2021-02-12 PROCEDURE — 74011250636 HC RX REV CODE- 250/636: Performed by: INTERNAL MEDICINE

## 2021-02-12 PROCEDURE — 74011250636 HC RX REV CODE- 250/636: Performed by: STUDENT IN AN ORGANIZED HEALTH CARE EDUCATION/TRAINING PROGRAM

## 2021-02-12 PROCEDURE — 85025 COMPLETE CBC W/AUTO DIFF WBC: CPT

## 2021-02-12 PROCEDURE — 49083 ABD PARACENTESIS W/IMAGING: CPT

## 2021-02-12 PROCEDURE — 96365 THER/PROPH/DIAG IV INF INIT: CPT

## 2021-02-12 PROCEDURE — P9047 ALBUMIN (HUMAN), 25%, 50ML: HCPCS | Performed by: RADIOLOGY

## 2021-02-12 RX ORDER — LORAZEPAM 2 MG/ML
2 INJECTION INTRAMUSCULAR
Status: DISCONTINUED | OUTPATIENT
Start: 2021-02-12 | End: 2021-02-13 | Stop reason: HOSPADM

## 2021-02-12 RX ORDER — ALBUMIN HUMAN 250 G/1000ML
50 SOLUTION INTRAVENOUS ONCE
Status: COMPLETED | OUTPATIENT
Start: 2021-02-12 | End: 2021-02-12

## 2021-02-12 RX ORDER — ALBUMIN HUMAN 250 G/1000ML
12.5 SOLUTION INTRAVENOUS ONCE
Status: COMPLETED | OUTPATIENT
Start: 2021-02-12 | End: 2021-02-12

## 2021-02-12 RX ORDER — LORAZEPAM 2 MG/ML
4 INJECTION INTRAMUSCULAR
Status: DISCONTINUED | OUTPATIENT
Start: 2021-02-12 | End: 2021-02-13 | Stop reason: HOSPADM

## 2021-02-12 RX ADMIN — ALBUMIN (HUMAN) 50 G: 0.25 INJECTION, SOLUTION INTRAVENOUS at 17:16

## 2021-02-12 RX ADMIN — SODIUM CHLORIDE 500 ML: 9 INJECTION, SOLUTION INTRAVENOUS at 20:06

## 2021-02-12 RX ADMIN — ALBUMIN (HUMAN) 12.5 G: 0.25 INJECTION, SOLUTION INTRAVENOUS at 20:43

## 2021-02-12 NOTE — ED TRIAGE NOTES
Patient is Bengali speaking, interpretor [id number] used, reports distended abdomen, states he had a paracentetics last week and believes he needs to have another one. Patient is unsure of medical history states \" I think there is something erong with my liver\"

## 2021-02-12 NOTE — Clinical Note
Patient Class[de-identified] OBSERVATION [104]   Type of Bed: Telemetry [19]   Reason for Observation: hypotension   Admitting Diagnosis: Hypotension [982252]   Admitting Physician: Josiah Garcia 62   Attending Physician: Julia Causey

## 2021-02-12 NOTE — ED PROVIDER NOTES
Chief Complaint   Patient presents with    Abdominal Pain     Patient is Omani-speaking only, history and review of systems was obtained through a [employer] . This is a 79-year-old male with a history of cirrhosis suspected due to a previous history of alcohol abuse, presenting with increasing abdominal distention and pain since he last underwent an IR guided paracentesis 1 to 2 weeks ago on 2/2/2021 when he was last seen in our emergency department. He was admitted in January for new diagnosis of cirrhosis but since then has not been able to follow-up with GI through [facility] as he says that he cannot afford it. 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 He denies any associated fevers, vomiting, shortness of breath, difficulty urinating, or any other systemic complaints. He reports having about 10 L removed during his last ED visit. No other systemic complaints. Symptoms are moderate in nature without any alleviating or exacerbating factors. No past medical history on file. CORRECTION:  Obtained by me, includes cirrhosis    No past surgical history on file. CORRECTION:  Obtained by me, includes previous paracentesis      No family history on file.     Social History     Socioeconomic History    Marital status:      Spouse name: Not on file    Number of children: Not on file    Years of education: Not on file    Highest education level: Not on file   Occupational History    Not on file   Social Needs    Financial resource strain: Not on file    Food insecurity     Worry: Not on file     Inability: Not on file    Transportation needs     Medical: Not on file     Non-medical: Not on file   Tobacco Use    Smoking status: Not on file   Substance and Sexual Activity    Alcohol use: Not on file    Drug use: Not on file    Sexual activity: Not on file   Lifestyle    Physical activity     Days per week: Not on file Minutes per session: Not on file    Stress: Not on file   Relationships    Social connections     Talks on phone: Not on file     Gets together: Not on file     Attends Baptism service: Not on file     Active member of club or organization: Not on file     Attends meetings of clubs or organizations: Not on file     Relationship status: Not on file    Intimate partner violence     Fear of current or ex partner: Not on file     Emotionally abused: Not on file     Physically abused: Not on file     Forced sexual activity: Not on file   Other Topics Concern    Not on file   Social History Narrative    Not on file         ALLERGIES: Patient has no known allergies. Review of Systems   Constitutional: Negative for fever. HENT: Negative for facial swelling. Eyes: Negative for redness. Respiratory: Negative for shortness of breath. Cardiovascular: Negative for chest pain. Gastrointestinal: Positive for abdominal distention and abdominal pain. Negative for vomiting. Genitourinary: Negative for difficulty urinating. Musculoskeletal: Negative for back pain. Neurological: Negative for headaches. Psychiatric/Behavioral: Negative for confusion.        Vitals:    02/12/21 1745 02/12/21 1815 02/12/21 1830 02/12/21 1845   BP: (!) 91/59 (!) 94/56 (!) 91/58 93/61   Pulse:       Resp:       Temp:       SpO2: 92% 100% 100% 100%   Weight:       Height:                Physical Exam  General:  Awake and alert, NAD  HEENT:  NC/AT, equal pupils, , +mild scleral icterus  Neck:   Normal inspection, full range of motion  Cardiac:  RRR, no murmurs  Respiratory:  Clear bilaterally, no wheezes, rales, rhonchi  Abdomen:  +Distended with tense ascites, mild diffuse tenderness to palpation without localization, no guarding  Extremities: Warm and well perfused, no peripheral edema  Neuro:  Moving all extremities symmetrically without gross motor deficit  Skin:   No rashes or pallor    RESULTS  Recent Results (from the past 12 hour(s))   CBC WITH AUTOMATED DIFF    Collection Time: 02/12/21  2:22 PM   Result Value Ref Range    WBC 6.8 4.1 - 11.1 K/uL    RBC 4.37 4.10 - 5.70 M/uL    HGB 13.3 12.1 - 17.0 g/dL    HCT 40.7 36.6 - 50.3 %    MCV 93.1 80.0 - 99.0 FL    MCH 30.4 26.0 - 34.0 PG    MCHC 32.7 30.0 - 36.5 g/dL    RDW 13.2 11.5 - 14.5 %    PLATELET 072 (H) 396 - 400 K/uL    MPV 8.7 (L) 8.9 - 12.9 FL    NRBC 0.0 0  WBC    ABSOLUTE NRBC 0.00 0.00 - 0.01 K/uL    NEUTROPHILS 70 32 - 75 %    LYMPHOCYTES 13 12 - 49 %    MONOCYTES 10 5 - 13 %    EOSINOPHILS 6 0 - 7 %    BASOPHILS 1 0 - 1 %    IMMATURE GRANULOCYTES 0 0.0 - 0.5 %    ABS. NEUTROPHILS 4.7 1.8 - 8.0 K/UL    ABS. LYMPHOCYTES 0.9 0.8 - 3.5 K/UL    ABS. MONOCYTES 0.7 0.0 - 1.0 K/UL    ABS. EOSINOPHILS 0.4 0.0 - 0.4 K/UL    ABS. BASOPHILS 0.1 0.0 - 0.1 K/UL    ABS. IMM. GRANS. 0.0 0.00 - 0.04 K/UL    DF AUTOMATED     METABOLIC PANEL, COMPREHENSIVE    Collection Time: 02/12/21  2:22 PM   Result Value Ref Range    Sodium 135 (L) 136 - 145 mmol/L    Potassium 3.8 3.5 - 5.1 mmol/L    Chloride 103 97 - 108 mmol/L    CO2 27 21 - 32 mmol/L    Anion gap 5 5 - 15 mmol/L    Glucose 101 (H) 65 - 100 mg/dL    BUN 11 6 - 20 MG/DL    Creatinine 0.94 0.70 - 1.30 MG/DL    BUN/Creatinine ratio 12 12 - 20      GFR est AA >60 >60 ml/min/1.73m2    GFR est non-AA >60 >60 ml/min/1.73m2    Calcium 7.9 (L) 8.5 - 10.1 MG/DL    Bilirubin, total 0.5 0.2 - 1.0 MG/DL    ALT (SGPT) 22 12 - 78 U/L    AST (SGOT) 41 (H) 15 - 37 U/L    Alk. phosphatase 182 (H) 45 - 117 U/L    Protein, total 7.2 6.4 - 8.2 g/dL    Albumin 2.4 (L) 3.5 - 5.0 g/dL    Globulin 4.8 (H) 2.0 - 4.0 g/dL    A-G Ratio 0.5 (L) 1.1 - 2.2     SAMPLES BEING HELD    Collection Time: 02/12/21  2:22 PM   Result Value Ref Range    SAMPLES BEING HELD 1BL,1RED,1SST     COMMENT        Add-on orders for these samples will be processed based on acceptable specimen integrity and analyte stability, which may vary by analyte.    PROTHROMBIN TIME + INR Collection Time: 02/12/21  2:22 PM   Result Value Ref Range    INR 1.0 0.9 - 1.1      Prothrombin time 10.8 9.0 - 11.1 sec        IMAGING  No results found. Procedures - none unless documented below    ED course: Therapeutic paracentesis performed by ELMA De Anda today with over 13 liters removed. Albumin given pre and post-procedure but he is still hypotensive to the 15'O systolic, ordered another round of albumin. Hypotension likely due to large volume paracentesis but given persistent hypotension will admit to the hospitalist service for continued management. He has not followed up with [facility] as he says he cannot afford it, I discussed his case with ED case management. Hospitalist Perfect Serve for Admission  7:12 PM    ED Room Number:   ER06/06  Patient Name and age:  Jeniffer Laguerre 51 y.o.  male  Working Diagnosis:     1. Hypotension, unspecified hypotension type    2. Ascites due to alcoholic cirrhosis ([address]      COVID suspicion:   no  Code Status:    Full Code  Readmission:    yes  Isolation Requirements:  no  Recommended Level of Care: telemetry  Department:    [address] [place] [address] ED - [phone]  Other:     Omani-speaking patient with known hx alcoholic cirrhosis diagnosed during admission last month, presented with ascites requesting paracentesis which was performed by IR today, 13+ liters removed. Albumin given, still hypotensive to the 69'R systolic, ordered another 25 g. He has not followed up with [facility] as he says he cannot afford it, I already discussed his case with ED case management.

## 2021-02-12 NOTE — PROGRESS NOTES
Patient brought to ultrasound for paracentesis. ID verified and Allergies verified using  services. Monitor x2 applied,  scan completed. MD called to bedside. Rani Huertas to bedside, consent obtained via  services. Time Out at 1557  Procedure started at 1558   Procedure completed at 1708    Total amount of fluid removed  13,350mls of clear yellow fluid. Procedure is therapeutic only no specimens needed. Procedure site dressed in sterile bandage. Patient tolerated well. Patient returned to ER via stretcher with verbal report given to primary RN for albumin infusion.

## 2021-02-13 VITALS
HEART RATE: 74 BPM | SYSTOLIC BLOOD PRESSURE: 100 MMHG | WEIGHT: 112.6 LBS | RESPIRATION RATE: 19 BRPM | DIASTOLIC BLOOD PRESSURE: 60 MMHG | BODY MASS INDEX: 19.95 KG/M2 | TEMPERATURE: 98 F | HEIGHT: 63 IN | OXYGEN SATURATION: 94 %

## 2021-02-13 LAB
ALBUMIN SERPL-MCNC: 2.3 G/DL (ref 3.5–5)
ALBUMIN/GLOB SERPL: 0.7 {RATIO} (ref 1.1–2.2)
ALP SERPL-CCNC: 109 U/L (ref 45–117)
ALT SERPL-CCNC: 14 U/L (ref 12–78)
ANION GAP SERPL CALC-SCNC: 6 MMOL/L (ref 5–15)
AST SERPL-CCNC: 25 U/L (ref 15–37)
BASOPHILS # BLD: 0.1 K/UL (ref 0–0.1)
BASOPHILS NFR BLD: 1 % (ref 0–1)
BILIRUB SERPL-MCNC: 1.2 MG/DL (ref 0.2–1)
BUN SERPL-MCNC: 9 MG/DL (ref 6–20)
BUN/CREAT SERPL: 16 (ref 12–20)
CALCIUM SERPL-MCNC: 7.7 MG/DL (ref 8.5–10.1)
CHLORIDE SERPL-SCNC: 110 MMOL/L (ref 97–108)
CO2 SERPL-SCNC: 22 MMOL/L (ref 21–32)
CREAT SERPL-MCNC: 0.58 MG/DL (ref 0.7–1.3)
DIFFERENTIAL METHOD BLD: ABNORMAL
EOSINOPHIL # BLD: 0.6 K/UL (ref 0–0.4)
EOSINOPHIL NFR BLD: 10 % (ref 0–7)
ERYTHROCYTE [DISTWIDTH] IN BLOOD BY AUTOMATED COUNT: 13 % (ref 11.5–14.5)
GLOBULIN SER CALC-MCNC: 3.1 G/DL (ref 2–4)
GLUCOSE SERPL-MCNC: 84 MG/DL (ref 65–100)
HCT VFR BLD AUTO: 34.5 % (ref 36.6–50.3)
HGB BLD-MCNC: 11.6 G/DL (ref 12.1–17)
IMM GRANULOCYTES # BLD AUTO: 0 K/UL (ref 0–0.04)
IMM GRANULOCYTES NFR BLD AUTO: 0 % (ref 0–0.5)
LYMPHOCYTES # BLD: 0.9 K/UL (ref 0.8–3.5)
LYMPHOCYTES NFR BLD: 13 % (ref 12–49)
MCH RBC QN AUTO: 31.1 PG (ref 26–34)
MCHC RBC AUTO-ENTMCNC: 33.6 G/DL (ref 30–36.5)
MCV RBC AUTO: 92.5 FL (ref 80–99)
MONOCYTES # BLD: 0.8 K/UL (ref 0–1)
MONOCYTES NFR BLD: 11 % (ref 5–13)
NEUTS SEG # BLD: 4.2 K/UL (ref 1.8–8)
NEUTS SEG NFR BLD: 65 % (ref 32–75)
NRBC # BLD: 0 K/UL (ref 0–0.01)
NRBC BLD-RTO: 0 PER 100 WBC
PLATELET # BLD AUTO: 378 K/UL (ref 150–400)
PMV BLD AUTO: 8.9 FL (ref 8.9–12.9)
POTASSIUM SERPL-SCNC: 3.8 MMOL/L (ref 3.5–5.1)
PROT SERPL-MCNC: 5.4 G/DL (ref 6.4–8.2)
RBC # BLD AUTO: 3.73 M/UL (ref 4.1–5.7)
SODIUM SERPL-SCNC: 138 MMOL/L (ref 136–145)
WBC # BLD AUTO: 6.6 K/UL (ref 4.1–11.1)

## 2021-02-13 PROCEDURE — 36415 COLL VENOUS BLD VENIPUNCTURE: CPT

## 2021-02-13 PROCEDURE — 99218 HC RM OBSERVATION: CPT

## 2021-02-13 PROCEDURE — 85025 COMPLETE CBC W/AUTO DIFF WBC: CPT

## 2021-02-13 PROCEDURE — 80053 COMPREHEN METABOLIC PANEL: CPT

## 2021-02-13 RX ORDER — ACETAMINOPHEN 650 MG/1
650 SUPPOSITORY RECTAL
Status: DISCONTINUED | OUTPATIENT
Start: 2021-02-13 | End: 2021-02-13 | Stop reason: HOSPADM

## 2021-02-13 RX ORDER — PROMETHAZINE HYDROCHLORIDE 25 MG/1
12.5 TABLET ORAL
Status: DISCONTINUED | OUTPATIENT
Start: 2021-02-13 | End: 2021-02-13 | Stop reason: HOSPADM

## 2021-02-13 RX ORDER — SODIUM CHLORIDE 0.9 % (FLUSH) 0.9 %
5-40 SYRINGE (ML) INJECTION EVERY 8 HOURS
Status: DISCONTINUED | OUTPATIENT
Start: 2021-02-13 | End: 2021-02-13 | Stop reason: HOSPADM

## 2021-02-13 RX ORDER — SODIUM CHLORIDE 0.9 % (FLUSH) 0.9 %
5-40 SYRINGE (ML) INJECTION AS NEEDED
Status: DISCONTINUED | OUTPATIENT
Start: 2021-02-13 | End: 2021-02-13 | Stop reason: HOSPADM

## 2021-02-13 RX ORDER — ONDANSETRON 2 MG/ML
4 INJECTION INTRAMUSCULAR; INTRAVENOUS
Status: DISCONTINUED | OUTPATIENT
Start: 2021-02-13 | End: 2021-02-13 | Stop reason: HOSPADM

## 2021-02-13 RX ORDER — POLYETHYLENE GLYCOL 3350 17 G/17G
17 POWDER, FOR SOLUTION ORAL DAILY PRN
Status: DISCONTINUED | OUTPATIENT
Start: 2021-02-13 | End: 2021-02-13 | Stop reason: HOSPADM

## 2021-02-13 RX ORDER — ACETAMINOPHEN 325 MG/1
650 TABLET ORAL
Status: DISCONTINUED | OUTPATIENT
Start: 2021-02-13 | End: 2021-02-13 | Stop reason: HOSPADM

## 2021-02-13 NOTE — PROGRESS NOTES
10:23 AM   02/13/21     Reason for Admission:  Paracentesis/acities                   RUR Score:  OBS                   Plan for utilizing home health:  None        PCP: First and Last name:  None given list of [facility]    Name of Practice:    Are you a current patient: Yes/No:    Approximate date of last visit:    Can you participate in a virtual visit with your PCP:                     Current Advanced Directive/Advance Care Plan:  Full does not wish to complete at this time     Yany Rollins  [phone]                           Transition of Care Plan:  I met with the patient using the [place] . The patient reports  That he lives with family in in a 2 story home with no steps to enter . The pateint does not have any insurance but can pay for prescriptions and obtains them at [facility] on [phone]. No prior HH or DME. No PCP is on record, The pateint was given a list of [facility] physicians. Patient is independent on ADL's but does not drive or have a license due to language barrier . OBS Letter received . The patient reported that he doesn't qualify for medicaid or medicare was already researched prior admissions.      Transition Care Plan :  1 Patient will follow up with PCP and specialities   2. Friend supportive will transport at discharge.   3. Case Management will follow for needs    Care Management Interventions  PCP Verified by CM: No  Mode of Transport at Discharge: Self  Transition of Care Consult (CM Consult): Discharge Planning  Discharge Durable Medical Equipment: No  Physical Therapy Consult: No  Occupational Therapy Consult: No  Current Support Network: Lives with Spouse, Own Home  Confirm Follow Up Transport: Family     Tanya Carl RN [facility]

## 2021-02-13 NOTE — DISCHARGE INSTRUCTIONS
HOSPITALIST DISCHARGE INSTRUCTIONS  NAME: Sharon Mail   :  1975   MRN:  [id number]     Date/Time:  2021 8:35 AM    ADMIT DATE: 2021     DISCHARGE DATE: 2021     ADMITTING DIAGNOSIS:  Hypotension    DISCHARGE DIAGNOSIS:  As above     MEDICATIONS:     · It is important that you take the medication exactly as they are prescribed. · Keep your medication in the bottles provided by the pharmacist and keep a list of the medication names, dosages, and times to be taken in your wallet. · Do not take other medications without consulting your doctor. Pain Management: per above medications    What to do at Home    Recommended diet:  Cardiac Diet    Recommended activity: Activity as tolerated    If you experience any of the following symptoms then please call your primary care physician or return to the emergency room if you cannot get hold of your doctor:  Fever, chills, nausea, vomiting, diarrhea, change in mentation, falling, bleeding, shortness of breath, chest pain     Follow Up: With hepatology as advised     Information obtained by :  I understand that if any problems occur once I am at home I am to contact my physician. I understand and acknowledge receipt of the instructions indicated above.                                                                                                                                            Physician's or R.N.'s Signature                                                                  Date/Time                                                                                                                                              Patient or Representative Signature                                                          Date/Time

## 2021-02-13 NOTE — PROGRESS NOTES
TRANSFER - IN REPORT:    Verbal report received from Dondra Schirmer (name) on Ar Howard  being received ER (unit) for routine progression of care      Report consisted of patients Situation, Background, Assessment and   Recommendations(SBAR). Information from the following report(s) SBAR, ED Summary, Intake/Output and Cardiac Rhythm NSR was reviewed with the receiving nurse, Yovana Guillen. Opportunity for questions and clarification was provided. 22:17- Assessment completed upon patients arrival to unit and care assumed. Patient Sinhala speaking only. Virtual  used to welcome patient to the floor and explain why he was here, skin assessment, and importance of using call bell before getting up and if he needed anything. Patient stated he understood. Vitals stable. Will continue to monitor. Int ID: [id number]    0700- Bedside and Verbal shift change report given to Rebekah (oncoming nurse) by Yovana Guillen (offgoing nurse). Report included the following information SBAR, Intake/Output, Recent Results and Cardiac Rhythm NSR.

## 2021-02-13 NOTE — H&P
[facility]  [address], [facility] [address]  [phone]    Admission History and Physical      NAME:  Jimmy Oro   :   1975   MRN:  [id number]     PCP:  None     Date of service:  2021         Subjective:     CHIEF COMPLAINT:     HISTORY OF PRESENT ILLNESS:     Mr. Genevieve Ortega is a 51 y.o.  male who is admitted with hypotension. Mr. Genevieve Ortega with PMH of alcohol liver cirrhosis, ascites, alcohol abuse came to ER for paracentesis. This is patient noted to be hypotensive, but not symptomatic. Was given albumin before the paracentesis and also given another dose after paracentesis. Patient feels good, denies dizziness or lightheadedness. No past medical history on file. No past surgical history on file. Social History     Tobacco Use    Smoking status: Not on file   Substance Use Topics    Alcohol use: Not on file        No family history on file. No Known Allergies     Prior to Admission medications    Medication Sig Start Date End Date Taking? Authorizing Provider   acetaminophen (TYLENOL) 325 mg tablet Take 650 mg by mouth every six (6) hours as needed for Pain.     Provider, Historical         Review of Systems:  (bold if positive, if negative)    Gen:  Eyes:  ENT:  CVS:  Pulm:  GI:  :  MS:  Skin:  Psych:  Endo:  Hem:  Renal:  Neuro:            Objective:      VITALS:    Vital signs reviewed; most recent are:    Visit Vitals  BP 93/61   Pulse 74   Temp 99.3 °F (37.4 °C)   Resp 14   Ht 5' 2.99\" (1.6 m)   Wt 64.2 kg (141 lb 8.6 oz)   SpO2 100%   BMI 25.08 kg/m²     SpO2 Readings from Last 6 Encounters:   21 100%   21 99%   21 97%   21 100%   21 98%        No intake or output data in the 24 hours ending 21         Exam:     Physical Exam:    Gen:  Well-developed, well-nourished, in no acute distress  HEENT:  Pink conjunctivae, PERRL, hearing intact to voice, moist mucous membranes  Neck:  Supple, without masses, thyroid non-tender  Resp:  No accessory muscle use, clear breath sounds without wheezes rales or rhonchi  Card:  No murmurs, normal S1, S2 without thrills, bruits or peripheral edema  Abd:  Soft, non-tender, non-distended, normoactive bowel sounds are present, no palpable organomegaly and no detectable hernias  Lymph:  No cervical or inguinal adenopathy  Musc:  No cyanosis or clubbing  Skin:  No rashes or ulcers, skin turgor is good  Neuro:  Cranial nerves are grossly intact, no focal motor weakness, follows commands appropriately  Psych:  Good insight, oriented to person, place and time, alert       Labs:    Recent Labs     02/12/21  1422   WBC 6.8   HGB 13.3   HCT 40.7   *     Recent Labs     02/12/21  1422   *   K 3.8      CO2 27   *   BUN 11   CREA 0.94   CA 7.9*   ALB 2.4*   TBILI 0.5   ALT 22     No results found for: GLUCPOC  No results for input(s): PH, PCO2, PO2, HCO3, FIO2 in the last 72 hours. Recent Labs     02/12/21  1422   INR 1.0       Telemetry reviewed:         Assessment/Plan:    1. Hypotension (2/12/2021). Most likely due to post paracentesis (not documented how much fluid was drained). Patient was given albumin before the procedure and also another dose was given after the procedure due to hypotension. Patient is totally asymptomatic. Will give a bolus of normal saline and if needed some more albumin. 2.  Ascites (1/18/2021)/liver cirrhosis due to alcohol abuse. Patient has intermittent paracentesis. On previous admission patient was instructed to follow-up at [facility]    3. Alcohol abuse (1/18/2021). Patient denies alcohol use in recent days. In any case we will start CIWA protocol    4. Hyponatremia (2/12/2021), mild due to cirrhosis.   Monitor         Previous medical records reviewed     Risk of deterioration: medium      Total time spent with patient: [address] discussed with: Patient, Nursing Staff and >50% of time spent in counseling and coordination of care    Discussed:  Care Plan    Prophylaxis:  SCD's    Probable Disposition:  Home w/Family           ___________________________________________________    Attending Physician: Samantha Skinner MD

## 2021-02-13 NOTE — ED NOTES
TRANSFER - OUT REPORT:    Verbal report given to [address], RN (name) on Briana Tucker  being transferred to [address] [facility] [address] (unit) for routine progression of care       Report consisted of patients Situation, Background, Assessment and   Recommendations(SBAR). Information from the following report(s) SBAR, Kardex, ED Summary, [facility] and Recent Results was reviewed with the receiving nurse. Lines:   Peripheral IV 02/12/21 Left Antecubital (Active)   Site Assessment Clean, dry, & intact 02/12/21 1420   Phlebitis Assessment 0 02/12/21 1420   Infiltration Assessment 0 02/12/21 1420   Dressing Status Clean, dry, & intact 02/12/21 1420   Hub Color/Line Status Pink 02/12/21 1420   Action Taken Blood drawn 02/12/21 1420        Opportunity for questions and clarification was provided.       Patient transported with:   Registered Nurse

## 2021-02-13 NOTE — PROGRESS NOTES
0730-  Bedside and Verbal shift change report given to GM RN  (oncoming nurse) by Renny Barksdale  (offgoing nurse). Report included the following information SBAR, Kardex and Recent Results. .    I have reviewed discharge instructions with the patient. The patient verbalized understanding. Kisha Pratt Discharged  The pt via YAQUELIN Duran. Kisha Pratt

## 2021-02-13 NOTE — PROGRESS NOTES
Admission Medication Reconciliation:     Information obtained from:   Patient via interview in ER 6 and phone call   Matthew Ash [phone] and  [id number] used for interview. RxQuery data available¹:  YES    Comments/Recommendations: The patient reports taking no medications at home. ¹RxQuery pharmacy benefit data reflects medications filled and processed through the patient's insurance, however   this data does NOT capture whether the medication was picked up or is currently being taken by the patient. None         Please contact the main inpatient pharmacy with any questions or concerns at [phone] and we will direct you to the clinical pharmacist covering this patient's care while in-house.    Berna Chambers, Kelvin, BCPS

## 2021-02-15 NOTE — PROGRESS NOTES
02/15/21 4:33 PM  CM contacted GI Clinic at [facility] ([phone], option 4, them option 2) to schedule appointment for patient.  CM unable to schedule appointment due to patient being self-pay and not having payor source.  Per scheduling rep, they are unable to schedule until patient completes [facility]'s financial screening process.  This financial screening process can only be completed with the patient.  Number to call for financial screening is [phone].  Once this completed, appointment can be scheduled.    This NOAH and Frederick Segal CM contacted the patient at the phone number listed--[phone].  Phone goes straight to voicemail and voicemail box is full.  CMs also called emergency contacted number [place] ([phone]); this phone line is not in service.    CMs unable to communicate info to patient on how to complete financial screening and schedule GI appointment.  ANDREE Gilbert

## 2021-02-24 ENCOUNTER — TRANSCRIBE ORDER (OUTPATIENT)
Dept: SCHEDULING | Age: 46
End: 2021-02-24

## 2021-02-24 ENCOUNTER — APPOINTMENT (OUTPATIENT)
Dept: ULTRASOUND IMAGING | Age: 46
End: 2021-02-24
Attending: EMERGENCY MEDICINE

## 2021-02-24 ENCOUNTER — HOSPITAL ENCOUNTER (EMERGENCY)
Age: 46
Discharge: HOME | End: 2021-02-24
Attending: EMERGENCY MEDICINE

## 2021-02-24 VITALS
SYSTOLIC BLOOD PRESSURE: 102 MMHG | BODY MASS INDEX: 22.57 KG/M2 | WEIGHT: 127.4 LBS | OXYGEN SATURATION: 100 % | RESPIRATION RATE: 18 BRPM | DIASTOLIC BLOOD PRESSURE: 62 MMHG | HEART RATE: 74 BPM | TEMPERATURE: 97.6 F

## 2021-02-24 DIAGNOSIS — K70.31 ALCOHOLIC CIRRHOSIS OF LIVER WITH ASCITES (HCC): Primary | ICD-10-CM

## 2021-02-24 DIAGNOSIS — K70.31 ASCITES DUE TO ALCOHOLIC CIRRHOSIS (HCC): Primary | ICD-10-CM

## 2021-02-24 LAB
ALBUMIN SERPL-MCNC: 2.2 G/DL (ref 3.5–5)
ALBUMIN/GLOB SERPL: 0.5 {RATIO} (ref 1.1–2.2)
ALP SERPL-CCNC: 183 U/L (ref 45–117)
ALT SERPL-CCNC: 24 U/L (ref 12–78)
ANION GAP SERPL CALC-SCNC: 5 MMOL/L (ref 5–15)
APTT PPP: 27.3 SEC (ref 22.1–31)
AST SERPL-CCNC: 33 U/L (ref 15–37)
BASOPHILS # BLD: 0.1 K/UL (ref 0–0.1)
BASOPHILS NFR BLD: 2 % (ref 0–1)
BILIRUB SERPL-MCNC: 0.7 MG/DL (ref 0.2–1)
BUN SERPL-MCNC: 14 MG/DL (ref 6–20)
BUN/CREAT SERPL: 15 (ref 12–20)
CALCIUM SERPL-MCNC: 8.7 MG/DL (ref 8.5–10.1)
CHLORIDE SERPL-SCNC: 103 MMOL/L (ref 97–108)
CO2 SERPL-SCNC: 27 MMOL/L (ref 21–32)
COMMENT, HOLDF: NORMAL
CREAT SERPL-MCNC: 0.91 MG/DL (ref 0.7–1.3)
DIFFERENTIAL METHOD BLD: ABNORMAL
EOSINOPHIL # BLD: 0.7 K/UL (ref 0–0.4)
EOSINOPHIL NFR BLD: 9 % (ref 0–7)
ERYTHROCYTE [DISTWIDTH] IN BLOOD BY AUTOMATED COUNT: 13.1 % (ref 11.5–14.5)
GLOBULIN SER CALC-MCNC: 4.8 G/DL (ref 2–4)
GLUCOSE SERPL-MCNC: 93 MG/DL (ref 65–100)
HCT VFR BLD AUTO: 40.2 % (ref 36.6–50.3)
HGB BLD-MCNC: 13.2 G/DL (ref 12.1–17)
IMM GRANULOCYTES # BLD AUTO: 0.1 K/UL (ref 0–0.04)
IMM GRANULOCYTES NFR BLD AUTO: 1 % (ref 0–0.5)
INR PPP: 1.1 (ref 0.9–1.1)
LIPASE SERPL-CCNC: 86 U/L (ref 73–393)
LYMPHOCYTES # BLD: 1.2 K/UL (ref 0.8–3.5)
LYMPHOCYTES NFR BLD: 16 % (ref 12–49)
MAGNESIUM SERPL-MCNC: 2.2 MG/DL (ref 1.6–2.4)
MCH RBC QN AUTO: 30.2 PG (ref 26–34)
MCHC RBC AUTO-ENTMCNC: 32.8 G/DL (ref 30–36.5)
MCV RBC AUTO: 92 FL (ref 80–99)
MONOCYTES # BLD: 0.9 K/UL (ref 0–1)
MONOCYTES NFR BLD: 11 % (ref 5–13)
NEUTS SEG # BLD: 4.8 K/UL (ref 1.8–8)
NEUTS SEG NFR BLD: 61 % (ref 32–75)
NRBC # BLD: 0 K/UL (ref 0–0.01)
NRBC BLD-RTO: 0 PER 100 WBC
PLATELET # BLD AUTO: 520 K/UL (ref 150–400)
PMV BLD AUTO: 8.9 FL (ref 8.9–12.9)
POTASSIUM SERPL-SCNC: 4 MMOL/L (ref 3.5–5.1)
PROT SERPL-MCNC: 7 G/DL (ref 6.4–8.2)
PROTHROMBIN TIME: 10.9 SEC (ref 9–11.1)
RBC # BLD AUTO: 4.37 M/UL (ref 4.1–5.7)
SAMPLES BEING HELD,HOLD: NORMAL
SODIUM SERPL-SCNC: 135 MMOL/L (ref 136–145)
THERAPEUTIC RANGE,PTTT: NORMAL SECS (ref 58–77)
WBC # BLD AUTO: 7.7 K/UL (ref 4.1–11.1)

## 2021-02-24 PROCEDURE — P9045 ALBUMIN (HUMAN), 5%, 250 ML: HCPCS | Performed by: EMERGENCY MEDICINE

## 2021-02-24 PROCEDURE — 74011250636 HC RX REV CODE- 250/636: Performed by: EMERGENCY MEDICINE

## 2021-02-24 PROCEDURE — 99284 EMERGENCY DEPT VISIT MOD MDM: CPT

## 2021-02-24 PROCEDURE — 36415 COLL VENOUS BLD VENIPUNCTURE: CPT

## 2021-02-24 PROCEDURE — 80053 COMPREHEN METABOLIC PANEL: CPT

## 2021-02-24 PROCEDURE — 85610 PROTHROMBIN TIME: CPT

## 2021-02-24 PROCEDURE — 96365 THER/PROPH/DIAG IV INF INIT: CPT

## 2021-02-24 PROCEDURE — 85730 THROMBOPLASTIN TIME PARTIAL: CPT

## 2021-02-24 PROCEDURE — 83690 ASSAY OF LIPASE: CPT

## 2021-02-24 PROCEDURE — 77030041470 HC TBNG SET PARA GISP -B

## 2021-02-24 PROCEDURE — P9047 ALBUMIN (HUMAN), 25%, 50ML: HCPCS | Performed by: RADIOLOGY

## 2021-02-24 PROCEDURE — 96366 THER/PROPH/DIAG IV INF ADDON: CPT

## 2021-02-24 PROCEDURE — 49083 ABD PARACENTESIS W/IMAGING: CPT

## 2021-02-24 PROCEDURE — 96372 THER/PROPH/DIAG INJ SC/IM: CPT

## 2021-02-24 PROCEDURE — 77030038620

## 2021-02-24 PROCEDURE — 74011000250 HC RX REV CODE- 250: Performed by: RADIOLOGY

## 2021-02-24 PROCEDURE — 85025 COMPLETE CBC W/AUTO DIFF WBC: CPT

## 2021-02-24 PROCEDURE — 96361 HYDRATE IV INFUSION ADD-ON: CPT

## 2021-02-24 PROCEDURE — 74011250636 HC RX REV CODE- 250/636: Performed by: RADIOLOGY

## 2021-02-24 PROCEDURE — 83735 ASSAY OF MAGNESIUM: CPT

## 2021-02-24 RX ORDER — SODIUM CHLORIDE 0.9 % (FLUSH) 0.9 %
5-40 SYRINGE (ML) INJECTION EVERY 8 HOURS
Status: DISCONTINUED | OUTPATIENT
Start: 2021-02-24 | End: 2021-02-24 | Stop reason: HOSPADM

## 2021-02-24 RX ORDER — ALBUMIN HUMAN 250 G/1000ML
25 SOLUTION INTRAVENOUS
Status: DISCONTINUED | OUTPATIENT
Start: 2021-02-24 | End: 2021-02-24 | Stop reason: HOSPADM

## 2021-02-24 RX ORDER — LIDOCAINE HYDROCHLORIDE 10 MG/ML
10 INJECTION, SOLUTION EPIDURAL; INFILTRATION; INTRACAUDAL; PERINEURAL
Status: COMPLETED | OUTPATIENT
Start: 2021-02-24 | End: 2021-02-24

## 2021-02-24 RX ORDER — SODIUM CHLORIDE 0.9 % (FLUSH) 0.9 %
5-40 SYRINGE (ML) INJECTION AS NEEDED
Status: DISCONTINUED | OUTPATIENT
Start: 2021-02-24 | End: 2021-02-24 | Stop reason: HOSPADM

## 2021-02-24 RX ORDER — ALBUMIN HUMAN 50 G/1000ML
50 SOLUTION INTRAVENOUS ONCE
Status: COMPLETED | OUTPATIENT
Start: 2021-02-24 | End: 2021-02-24

## 2021-02-24 RX ADMIN — SODIUM CHLORIDE 1000 ML: 9 INJECTION, SOLUTION INTRAVENOUS at 06:53

## 2021-02-24 RX ADMIN — LIDOCAINE HYDROCHLORIDE 10 ML: 10 INJECTION, SOLUTION EPIDURAL; INFILTRATION; INTRACAUDAL; PERINEURAL at 11:45

## 2021-02-24 RX ADMIN — ALBUMIN (HUMAN) 25 G: 0.25 INJECTION, SOLUTION INTRAVENOUS at 11:56

## 2021-02-24 RX ADMIN — ALBUMIN (HUMAN) 50 G: 12.5 INJECTION, SOLUTION INTRAVENOUS at 07:38

## 2021-02-24 NOTE — PROGRESS NOTES
Pt received to US on stretcher, belly distended. Confirmed NKA. Vitals stable. Consent signed with Dr Yasmani Her, timeout at [phone], start time [phone]. Draining clear yellow fluid from R side abdomen. 25 g albumin given to patient. Discussed future POC with pt's friend Carlos Bellamy over the phone. Written standing order for paracentesis was provided by ER MD Garcia Lakhani. Plan is for pt to return as OP on a weekly basis (Wednesdays), discussed plan with Carlos Bellamy who is pts transportation. Carlos Bellamy is also aware that patient has an appt this coming Friday with Dr Juan Chapman, provided phone number and address again to Mr Manrique--this was provided on last visit. 1225--11.2L removed from patient, clean dressing applied to pt's R side. Pt returned to ER.

## 2021-02-24 NOTE — ED PROVIDER NOTES
History of cirrhosis with ascites likely from alcohol abuse. He presents with complaints of generalized abdominal pain and distention. He was last seen here approximately 12 days ago and had 13+ liters drained via paracentesis. He had been seen 12 days prior to that and had 10 L drained. He states that he has follow-up with a specialist in 2 days but could not wait due to his abdominal discomfort. He states that he has not been able to eat or drink much of anything since his abdomen has become so swollen. The pain is moderate without relieving factors. He denies any nausea or vomiting. [place] . No past medical history on file. No past surgical history on file. No family history on file.     Social History     Socioeconomic History    Marital status:      Spouse name: Not on file    Number of children: Not on file    Years of education: Not on file    Highest education level: Not on file   Occupational History    Not on file   Social Needs    Financial resource strain: Not on file    Food insecurity     Worry: Not on file     Inability: Not on file    Transportation needs     Medical: Not on file     Non-medical: Not on file   Tobacco Use    Smoking status: Not on file   Substance and Sexual Activity    Alcohol use: Not on file    Drug use: Not on file    Sexual activity: Not on file   Lifestyle    Physical activity     Days per week: Not on file     Minutes per session: Not on file    Stress: Not on file   Relationships    Social connections     Talks on phone: Not on file     Gets together: Not on file     Attends Buddhist service: Not on file     Active member of club or organization: Not on file     Attends meetings of clubs or organizations: Not on file     Relationship status: Not on file    Intimate partner violence     Fear of current or ex partner: Not on file     Emotionally abused: Not on file     Physically abused: Not on file     Forced sexual activity: Not on file   Other Topics Concern    Not on file   Social History Narrative    Not on file         ALLERGIES: Patient has no known allergies. Review of Systems   All other systems reviewed and are negative. Vitals:    02/24/21 0543   BP: (!) 121/95   Pulse: 76   Resp: 17   Temp: 97.6 °F (36.4 °C)   SpO2: 100%   Weight: 57.8 kg (127 lb 6.4 oz)            Physical Exam  Vitals signs and nursing note reviewed. Constitutional:       Appearance: He is well-developed. Comments: Thin. Appears uncomfortable. HENT:      Head: Normocephalic and atraumatic. Eyes:      Conjunctiva/sclera: Conjunctivae normal.   Neck:      Musculoskeletal: Neck supple. Trachea: No tracheal deviation. Comments: Generalized abdominal tenderness with tense ascites. Cardiovascular:      Rate and Rhythm: Normal rate and regular rhythm. Heart sounds: Normal heart sounds. No murmur. No friction rub. No gallop. Pulmonary:      Effort: Pulmonary effort is normal.      Breath sounds: Normal breath sounds. Abdominal:      General: There is distension. Palpations: Abdomen is soft. Tenderness: There is no abdominal tenderness. Musculoskeletal:         General: No deformity. Skin:     General: Skin is warm and dry. Neurological:      Mental Status: He is alert. Comments: oriented          MDM       Procedures    Assessment/plan: Patient was initially diagnosed with liver cirrhosis with secondary ascites a little over a month ago. He has been seen here several times since then for abdominal discomfort due to ascites. He had paracenteses done 22 days ago and 12 days ago. He presents today again with tense ascites. The plan is to do another ultrasound-guided paracentesis for symptomatic relief. He has had the fluid analyzed previously.   He states that he has follow-up in 2 days with a specialist.  He will be signed over to the dayshift  At [address] [place] [address] Seth Cole MD  6:13 AM    715 AM  Change of shift. Care of patient signed over to Dr. Dinora Leon. Handoff complete. Awaiting paracentesis/reassessment.   Jaime Monterroso MD

## 2021-02-24 NOTE — DISCHARGE INSTRUCTIONS
Please make a follow-up appointment with hepatology at [facility] or at [facility]. Please a paracentesis if you anticipate needing one before you are able to make that appointment. We have provided a prescription for outpatient paracentesis.

## 2021-02-24 NOTE — ED NOTES
9:26 AM  Change of shift. Care of patient taken over from Dr. Jesus Morelos; H&P reviewed, bedside handoff complete. Awaiting paracentesis, reevaluation, plan at this point is for discharge. I was informed by nursing staff that he is scheduled for this afternoon at the earliest.    11:07 AM   Discussed with ultrasound technician, placed orders for outpatient paracentesis so that patient can schedule this as well as we will provide multiple referrals for outpatient hepatology. 12:40 PM   Patient returned from ultrasound guided paracentesis, had a successful procedure based on verbal report, his blood pressure is normal and he is asymptomatic. Okay for discharge.

## 2021-03-03 ENCOUNTER — HOSPITAL ENCOUNTER (OUTPATIENT)
Dept: ULTRASOUND IMAGING | Age: 46
Discharge: HOME | End: 2021-03-03
Attending: STUDENT IN AN ORGANIZED HEALTH CARE EDUCATION/TRAINING PROGRAM

## 2021-03-03 VITALS
OXYGEN SATURATION: 100 % | HEART RATE: 83 BPM | RESPIRATION RATE: 14 BRPM | DIASTOLIC BLOOD PRESSURE: 59 MMHG | SYSTOLIC BLOOD PRESSURE: 92 MMHG

## 2021-03-03 DIAGNOSIS — K70.31 ASCITES DUE TO ALCOHOLIC CIRRHOSIS (HCC): ICD-10-CM

## 2021-03-03 LAB
COMMENT, HOLDF: NORMAL
SAMPLES BEING HELD,HOLD: NORMAL

## 2021-03-03 PROCEDURE — 74011250636 HC RX REV CODE- 250/636: Performed by: RADIOLOGY

## 2021-03-03 PROCEDURE — P9047 ALBUMIN (HUMAN), 25%, 50ML: HCPCS | Performed by: RADIOLOGY

## 2021-03-03 PROCEDURE — 77030041470 HC TBNG SET PARA GISP -B

## 2021-03-03 PROCEDURE — 74011000250 HC RX REV CODE- 250: Performed by: RADIOLOGY

## 2021-03-03 PROCEDURE — 49083 ABD PARACENTESIS W/IMAGING: CPT

## 2021-03-03 PROCEDURE — 77030038620

## 2021-03-03 RX ORDER — ALBUMIN HUMAN 250 G/1000ML
25 SOLUTION INTRAVENOUS
Status: DISCONTINUED | OUTPATIENT
Start: 2021-03-03 | End: 2021-03-07 | Stop reason: HOSPADM

## 2021-03-03 RX ORDER — LIDOCAINE HYDROCHLORIDE 10 MG/ML
10 INJECTION, SOLUTION EPIDURAL; INFILTRATION; INTRACAUDAL; PERINEURAL
Status: COMPLETED | OUTPATIENT
Start: 2021-03-03 | End: 2021-03-03

## 2021-03-03 RX ADMIN — LIDOCAINE HYDROCHLORIDE 10 ML: 10 INJECTION, SOLUTION EPIDURAL; INFILTRATION; INTRACAUDAL; PERINEURAL at 10:00

## 2021-03-03 RX ADMIN — ALBUMIN (HUMAN) 25 G: 0.25 INJECTION, SOLUTION INTRAVENOUS at 09:23

## 2021-03-03 RX ADMIN — ALBUMIN (HUMAN) 25 G: 0.25 INJECTION, SOLUTION INTRAVENOUS at 09:45

## 2021-03-03 NOTE — PROGRESS NOTES
Pt received to US for paracentesis, pt confirmed NKA/NPO. Vitals stable, Dr Garcia Confer for consent. Timeout at [phone], start time 0921. Pt draining clear yellow fluid from R side abdomen. 0953, 8L drained so far, 50 g IV albumin given so far in L AC IV.    1012. Pt tolerated well, total of 10.1L drained from R abdomen. Orthostatics stable. D/c directions provided.

## 2021-03-03 NOTE — DISCHARGE INSTRUCTIONS
Patient Education        Learning About Paracentesis  What is paracentesis? Paracentesis (say \"ngnw-rl-cme-CHELI-vesna\") is a procedure that removes fluid from the belly. The buildup of fluid may be caused by infection, inflammation, an injury, or other problems. Swelling from too much fluid may cause pain or trouble breathing. The doctor will remove the extra fluid with a needle attached to a tube. Why is paracentesis done? Paracentesis may be done to:  · Find the cause of fluid buildup in the belly. · Diagnose an infection in the peritoneal fluid. · Check for certain types of cancer. · Remove a large amount of fluid that is causing pain or trouble breathing or that is affecting how the kidneys or the intestines (bowel) are working. · Check for damage after a belly injury. How is the procedure done? You will empty your bladder before the procedure. Your doctor or nurse will clean the area of your belly where the needle will go in. Then he or she will put sterile towels around the area. You may get a shot of numbing medicine in the skin of your belly. Then your doctor will gently insert a needle where the fluid is. He or she may attach a tube (catheter) to the site to help collect the fluid. After the fluid has drained, your doctor will take out the needle or catheter and put a bandage on the site. How long does a paracentesis take? The procedure may take from a few minutes to 30 minutes or more. What happens after the test?  You can do your normal activities after the procedure, unless your doctor tells you not to. After the procedure, you may have some clear fluid draining from the site, especially if a large amount of fluid was taken out. There will be less drainage in 1 to 2 days. Ask your doctor how much drainage to expect. A small gauze pad and bandage may be needed. You may need to change the bandage.   If your doctor thinks that testing the fluid can help find the cause of a problem, he or she will send it to a lab. If fluid builds up in your belly again, your doctor may repeat this procedure. When should you call for help? Call 911 anytime you think you may need emergency care. For example, call if:  · You passed out (lost consciousness). Call your doctor now or seek immediate medical care if:  · You have symptoms of infection, such as:  ? Increased pain, swelling, warmth, or redness. ? Red streaks or pus. ? A fever. · You are dizzy or lightheaded, or you feel like you may faint. · You have new or worse belly pain. Watch closely for changes in your health, and be sure to contact your doctor if:  · Fluid builds up in your belly again. · You do not get better as expected. Where can you learn more? Go to http://www.gray.com/  Enter X447 in the search box to learn more about \"Learning About Paracentesis. \"  Current as of: April 15, 2020               Content Version: 12.6  © 2006-2020 [employer]. Care instructions adapted under license by [employer] (which disclaims liability or warranty for this information). If you have questions about a medical condition or this instruction, always ask your healthcare professional. [place] [address] any warranty or liability for your use of this information.

## 2021-03-04 LAB
ACID FAST STN SPEC: NEGATIVE
MYCOBACTERIUM SPEC QL CULT: NEGATIVE
SPECIMEN PREPARATION: NORMAL
SPECIMEN SOURCE: NORMAL

## 2021-03-10 ENCOUNTER — HOSPITAL ENCOUNTER (OUTPATIENT)
Dept: ULTRASOUND IMAGING | Age: 46
Discharge: HOME | End: 2021-03-10
Attending: STUDENT IN AN ORGANIZED HEALTH CARE EDUCATION/TRAINING PROGRAM

## 2021-03-10 VITALS
DIASTOLIC BLOOD PRESSURE: 57 MMHG | SYSTOLIC BLOOD PRESSURE: 87 MMHG | OXYGEN SATURATION: 100 % | HEART RATE: 81 BPM | RESPIRATION RATE: 15 BRPM

## 2021-03-10 DIAGNOSIS — K70.31 ASCITES DUE TO ALCOHOLIC CIRRHOSIS (HCC): ICD-10-CM

## 2021-03-10 PROCEDURE — 77030038620

## 2021-03-10 PROCEDURE — 49083 ABD PARACENTESIS W/IMAGING: CPT

## 2021-03-10 PROCEDURE — 74011250636 HC RX REV CODE- 250/636: Performed by: STUDENT IN AN ORGANIZED HEALTH CARE EDUCATION/TRAINING PROGRAM

## 2021-03-10 PROCEDURE — P9047 ALBUMIN (HUMAN), 25%, 50ML: HCPCS | Performed by: STUDENT IN AN ORGANIZED HEALTH CARE EDUCATION/TRAINING PROGRAM

## 2021-03-10 PROCEDURE — 77030041470 HC TBNG SET PARA GISP -B

## 2021-03-10 RX ORDER — ALBUMIN HUMAN 250 G/1000ML
12.5 SOLUTION INTRAVENOUS
Status: COMPLETED | OUTPATIENT
Start: 2021-03-10 | End: 2021-03-10

## 2021-03-10 RX ADMIN — ALBUMIN (HUMAN) 12.5 G: 0.25 INJECTION, SOLUTION INTRAVENOUS at 08:57

## 2021-03-10 RX ADMIN — ALBUMIN (HUMAN) 12.5 G: 0.25 INJECTION, SOLUTION INTRAVENOUS at 09:07

## 2021-03-10 RX ADMIN — ALBUMIN (HUMAN) 12.5 G: 0.25 INJECTION, SOLUTION INTRAVENOUS at 09:25

## 2021-03-10 RX ADMIN — ALBUMIN (HUMAN) 12.5 G: 0.25 INJECTION, SOLUTION INTRAVENOUS at 09:46

## 2021-03-10 NOTE — PROGRESS NOTES
Patient brought to ultrasound for pre procedure  scan for paracentesis. Ambulated well and without assistance. Changed into gown. ID verified and Allergies verified. Wrist bands applied. Monitor x2 applied. 22g PIV started in left forearm. Albumin spiked and hung for infusion as needed. Dr. Kim Phillips to bedside at 5499, Consent obtained    Time out at  0853  End of procedure 0955        Dry sterile dressing applied to procedure site, noted as CDI. Patient tolerated well. Total ascites removed :  9600mls of clear yellow ascites            Total albumin given via infusion:    50g/200ml    IV discontinued, tip intact. Patient taken via wheelchair to awaiting transportation home. No further intervention needed. Patient aware of follow up appointment for paracentesis next week Wednesday.

## 2021-03-17 ENCOUNTER — HOSPITAL ENCOUNTER (OUTPATIENT)
Dept: ULTRASOUND IMAGING | Age: 46
Discharge: HOME | End: 2021-03-17
Attending: STUDENT IN AN ORGANIZED HEALTH CARE EDUCATION/TRAINING PROGRAM

## 2021-03-17 VITALS
DIASTOLIC BLOOD PRESSURE: 50 MMHG | OXYGEN SATURATION: 99 % | HEART RATE: 86 BPM | SYSTOLIC BLOOD PRESSURE: 89 MMHG | RESPIRATION RATE: 14 BRPM

## 2021-03-17 DIAGNOSIS — K70.31 ALCOHOLIC CIRRHOSIS OF LIVER WITH ASCITES (HCC): ICD-10-CM

## 2021-03-17 PROCEDURE — P9047 ALBUMIN (HUMAN), 25%, 50ML: HCPCS | Performed by: RADIOLOGY

## 2021-03-17 PROCEDURE — 74011000250 HC RX REV CODE- 250: Performed by: RADIOLOGY

## 2021-03-17 PROCEDURE — 77030038620

## 2021-03-17 PROCEDURE — 77030041470 HC TBNG SET PARA GISP -B

## 2021-03-17 PROCEDURE — 74011250636 HC RX REV CODE- 250/636: Performed by: RADIOLOGY

## 2021-03-17 PROCEDURE — 49083 ABD PARACENTESIS W/IMAGING: CPT

## 2021-03-17 RX ORDER — ALBUMIN HUMAN 250 G/1000ML
25 SOLUTION INTRAVENOUS
Status: DISCONTINUED | OUTPATIENT
Start: 2021-03-17 | End: 2021-03-21 | Stop reason: HOSPADM

## 2021-03-17 RX ORDER — LIDOCAINE HYDROCHLORIDE 10 MG/ML
10 INJECTION, SOLUTION EPIDURAL; INFILTRATION; INTRACAUDAL; PERINEURAL
Status: COMPLETED | OUTPATIENT
Start: 2021-03-17 | End: 2021-03-17

## 2021-03-17 RX ADMIN — LIDOCAINE HYDROCHLORIDE 10 ML: 10 INJECTION, SOLUTION EPIDURAL; INFILTRATION; INTRACAUDAL; PERINEURAL at 08:30

## 2021-03-17 RX ADMIN — ALBUMIN (HUMAN) 12.5 G: 0.25 INJECTION, SOLUTION INTRAVENOUS at 09:03

## 2021-03-17 RX ADMIN — ALBUMIN (HUMAN) 25 G: 0.25 INJECTION, SOLUTION INTRAVENOUS at 08:38

## 2021-03-17 NOTE — PROGRESS NOTES
Pt received to [facility] ambulatory. Belly distended. Confirmed NKA, pt was NPO. Vitals taken, BP low 85/53. Pt asymptomatic. Dr Don Bess for consent,  used. Timeout at 0830, start time 0831. IV started in L AC. Albumin hung as a large amount of fluid is to be obtained. Draining clear yellow fluid from R side abdomen. 0636--4527 drained from pt, tolerated well, 35.5g albumin IV given. Pt BP stayed steady throughout procedure. Clean dressing applied, orthostatics stable. Pt declined d/c directions although reiterated importance of follow up with MD Mariano Carlisle as he has not yet gone to appt. Pt again given phone number and address per request--3rd time given. Pt taken out to waiting area for discharge.

## 2021-03-17 NOTE — DISCHARGE INSTRUCTIONS
Patient Education        Learning About Paracentesis  What is paracentesis? Paracentesis (say \"rdki-vt-cvz-CHELI-vesna\") is a procedure that removes fluid from the belly. The buildup of fluid may be caused by infection, inflammation, an injury, or other problems. Swelling from too much fluid may cause pain or trouble breathing. The doctor will remove the extra fluid with a needle attached to a tube. Why is paracentesis done? Paracentesis may be done to:  · Find the cause of fluid buildup in the belly. · Diagnose an infection in the peritoneal fluid. · Check for certain types of cancer. · Remove a large amount of fluid that is causing pain or trouble breathing or that is affecting how the kidneys or the intestines (bowel) are working. · Check for damage after a belly injury. How is the procedure done? You will empty your bladder before the procedure. Your doctor or nurse will clean the area of your belly where the needle will go in. Then he or she will put sterile towels around the area. You may get a shot of numbing medicine in the skin of your belly. Then your doctor will gently insert a needle where the fluid is. He or she may attach a tube (catheter) to the site to help collect the fluid. After the fluid has drained, your doctor will take out the needle or catheter and put a bandage on the site. How long does a paracentesis take? The procedure may take from a few minutes to 30 minutes or more. What happens after the test?  You can do your normal activities after the procedure, unless your doctor tells you not to. After the procedure, you may have some clear fluid draining from the site, especially if a large amount of fluid was taken out. There will be less drainage in 1 to 2 days. Ask your doctor how much drainage to expect. A small gauze pad and bandage may be needed. You may need to change the bandage.   If your doctor thinks that testing the fluid can help find the cause of a problem, he or she will send it to a lab. If fluid builds up in your belly again, your doctor may repeat this procedure. When should you call for help? Call 911 anytime you think you may need emergency care. For example, call if:  · You passed out (lost consciousness). Call your doctor now or seek immediate medical care if:  · You have symptoms of infection, such as:  ? Increased pain, swelling, warmth, or redness. ? Red streaks or pus. ? A fever. · You are dizzy or lightheaded, or you feel like you may faint. · You have new or worse belly pain. Watch closely for changes in your health, and be sure to contact your doctor if:  · Fluid builds up in your belly again. · You do not get better as expected. Where can you learn more? Go to http://www.gray.com/  Enter X447 in the search box to learn more about \"Learning About Paracentesis. \"  Current as of: April 15, 2020               Content Version: 12.6  © 2006-2020 [employer]. Care instructions adapted under license by [employer] (which disclaims liability or warranty for this information). If you have questions about a medical condition or this instruction, always ask your healthcare professional. [place] [address] any warranty or liability for your use of this information.

## 2021-03-24 ENCOUNTER — HOSPITAL ENCOUNTER (OUTPATIENT)
Dept: ULTRASOUND IMAGING | Age: 46
Discharge: HOME | End: 2021-03-24
Attending: STUDENT IN AN ORGANIZED HEALTH CARE EDUCATION/TRAINING PROGRAM

## 2021-03-24 VITALS
RESPIRATION RATE: 16 BRPM | OXYGEN SATURATION: 100 % | SYSTOLIC BLOOD PRESSURE: 88 MMHG | HEART RATE: 75 BPM | DIASTOLIC BLOOD PRESSURE: 55 MMHG

## 2021-03-24 DIAGNOSIS — K70.31 ALCOHOLIC CIRRHOSIS OF LIVER WITH ASCITES (HCC): ICD-10-CM

## 2021-03-24 PROCEDURE — 49083 ABD PARACENTESIS W/IMAGING: CPT

## 2021-03-24 PROCEDURE — 74011250636 HC RX REV CODE- 250/636: Performed by: RADIOLOGY

## 2021-03-24 PROCEDURE — 77030041470 HC TBNG SET PARA GISP -B

## 2021-03-24 PROCEDURE — 77030038620

## 2021-03-24 PROCEDURE — 74011000250 HC RX REV CODE- 250: Performed by: RADIOLOGY

## 2021-03-24 PROCEDURE — P9047 ALBUMIN (HUMAN), 25%, 50ML: HCPCS | Performed by: RADIOLOGY

## 2021-03-24 RX ORDER — ALBUMIN HUMAN 250 G/1000ML
25 SOLUTION INTRAVENOUS AS NEEDED
Status: DISCONTINUED | OUTPATIENT
Start: 2021-03-24 | End: 2021-03-28 | Stop reason: HOSPADM

## 2021-03-24 RX ORDER — LIDOCAINE HYDROCHLORIDE 10 MG/ML
10 INJECTION, SOLUTION EPIDURAL; INFILTRATION; INTRACAUDAL; PERINEURAL
Status: COMPLETED | OUTPATIENT
Start: 2021-03-24 | End: 2021-03-24

## 2021-03-24 RX ADMIN — ALBUMIN (HUMAN) 25 G: 0.25 INJECTION, SOLUTION INTRAVENOUS at 09:42

## 2021-03-24 RX ADMIN — LIDOCAINE HYDROCHLORIDE 10 ML: 10 INJECTION, SOLUTION EPIDURAL; INFILTRATION; INTRACAUDAL; PERINEURAL at 09:30

## 2021-03-24 NOTE — PROGRESS NOTES
Pt encountered in ultrasound for US guided paracentesis. Consent has already been obtained. Pt with firm round abdomen, no peripheral edema note. Pt denies complaint. 1020 Procedure complete. Gauze and transparent dressing applied to RUQ site. Total of 6440 ml clear yellow fluid drained. Abdomen soft and flat. Total of 25 gm albumin administered IV. Orthostatic vitals WDL. Discharge instructions written in Croatian provided to pt. Discharge instructions, with emphasis on follow up with PCP and GI given through [address] [facility] speaking student ultrasonographer. 1030 Pt discharged ambulatory to [facility] where he met a taxi.

## 2021-03-24 NOTE — DISCHARGE INSTRUCTIONS
Patient Education   Patient Education        Aprenda acerca de la paracentesis  Learning About Paracentesis  ¿Qué es la paracentesis? La paracentesis es un procedimiento que extrae líquido del abdomen (vientre). La acumulación de líquido puede estar causada por william infección, william inflamación, william lesión u otros problemas. La hinchazón por demasiado líquido puede causar dolor o problemas para respirar. El médico extraerá el líquido sobrante con Francetta Coffee Yan[place] Ruts a un tubo. Lyn médico puede extraer el líquido para:  · Diagnosticar william infección, william lesión o alguna otra afección. · Aliviar la presión en el abdomen. ¿Cómo se hace el procedimiento? El médico o la enfermera le limpiarán la leonie del abdomen donde le insertarán la aguja. Luego le pondrán toallas estériles alrededor de la leonie. Es posible que le apliquen en el vientre william inyección de medicamento para adormecer la leonie. Luego, el médico le insertará suavemente william aguja donde está el líquido. Es posible que adhiera un tubo (sonda) al sitio para ayudar a recolectar el líquido. El procedimiento puede llevar de unos pocos minutos a 30 minutos o más. Después de que se ha drenado el líquido, el médico le sacará la aguja o la sonda y pondrá william venda en el sitio. ¿Qué puede esperar después del procedimiento? El médico le examinará el pulso, la presión arterial y la temperatura por alrededor de Francetta Coffee hora. Si el médico piensa que un análisis del líquido puede ayudar a encontrar la causa de un problema, lo enviará a un laboratorio. Es posible que por hasta 2 días después del procedimiento le salga william pequeña cantidad de líquido elvia del sitio donde le insertaron la aguja, especialmente si le extrajeron william gran cantidad de líquido. Quizá deba cambiar la venda del sitio. Después del procedimiento, usted puede hacer vesna actividades normales, a menos que el [employer] diga que no.   Si se acumula líquido en lyn vientre nuevamente, es posible que el [address] repita armen procedimiento. ¿Cuándo debe pedir ayuda? Llame al 911 en cualquier momento que piense que puede necesitar atención de emergencia. Por ejemplo, llame si:  · Se desmayó (perdió el conocimiento). Llame a lyn médico ahora mismo o busque atención médica inmediata si:  · Tiene síntomas de infección, tales ara:  ? Aumento de dolor, hinchazón, temperatura o enrojecimiento. ? Vetas ordonez o pus.  ? [place]. · Está mareado o aturdido, o siente que [employer]. · Tiene un dolor abdominal nuevo o más intenso. Vigile muy de cerca los cambios en lyn tricia, y asegúrese de comunicarse con lyn médico si:  · Se acumula líquido en lyn abdomen otra vez. · No mejora ara se esperaba. ¿Dónde puede encontrar más información en inglés? Vaya a http://www.gray.com/  Howie X447 en la búsqueda para aprender más acerca de \"Aprenda acerca de la paracentesis. \"  Revisado: 15 abril, 2020               Versión del contenido: 12.6  © 1095-3533 [employer]wise, Incorporated. Las instrucciones de cuidado fueron adaptadas bajo licencia por [employer] (which disclaims liability or warranty for this information). Si usted tiene [place] afección médica o sobre estas instrucciones, siempre pregunte a lyn profesional de tricia. Healthwise, Incorporated niega toda garantía o responsabilidad por lyn uso de esta información. Learning About Paracentesis  What is paracentesis? Paracentesis (say \"wagd-an-dek-CHELI-vesna\") is a procedure that removes fluid from the belly. The buildup of fluid may be caused by infection, inflammation, an injury, or other problems. Swelling from too much fluid may cause pain or trouble breathing. The doctor will remove the extra fluid with a needle attached to a tube. Why is paracentesis done? Paracentesis may be done to:  · Find the cause of fluid buildup in the belly. · Diagnose an infection in the peritoneal fluid.   · Check for certain types of cancer. · Remove a large amount of fluid that is causing pain or trouble breathing or that is affecting how the kidneys or the intestines (bowel) are working. · Check for damage after a belly injury. How is the procedure done? You will empty your bladder before the procedure. Your doctor or nurse will clean the area of your belly where the needle will go in. Then he or she will put sterile towels around the area. You may get a shot of numbing medicine in the skin of your belly. Then your doctor will gently insert a needle where the fluid is. He or she may attach a tube (catheter) to the site to help collect the fluid. After the fluid has drained, your doctor will take out the needle or catheter and put a bandage on the site. How long does a paracentesis take? The procedure may take from a few minutes to 30 minutes or more. What happens after the test?  You can do your normal activities after the procedure, unless your doctor tells you not to. After the procedure, you may have some clear fluid draining from the site, especially if a large amount of fluid was taken out. There will be less drainage in 1 to 2 days. Ask your doctor how much drainage to expect. A small gauze pad and bandage may be needed. You may need to change the bandage. If your doctor thinks that testing the fluid can help find the cause of a problem, he or she will send it to a lab. If fluid builds up in your belly again, your doctor may repeat this procedure. When should you call for help? Call 911 anytime you think you may need emergency care. For example, call if:  · You passed out (lost consciousness). Call your doctor now or seek immediate medical care if:  · You have symptoms of infection, such as:  ? Increased pain, swelling, warmth, or redness. ? Red streaks or pus. ? A fever. · You are dizzy or lightheaded, or you feel like you may faint. · You have new or worse belly pain.   Watch closely for changes in your health, and be sure to contact your doctor if:  · Fluid builds up in your belly again. · You do not get better as expected. Where can you learn more? Go to http://orion-jen.info/  Enter X447 in the search box to learn more about \"Learning About Paracentesis. \"  Current as of: April 15, 2020               Content Version: 12.6  © 7362-4551 [employer]. Care instructions adapted under license by [employer] (which disclaims liability or warranty for this information). If you have questions about a medical condition or this instruction, always ask your healthcare professional. [place] [address] any warranty or liability for your use of this information.

## 2021-03-31 ENCOUNTER — HOSPITAL ENCOUNTER (OUTPATIENT)
Dept: ULTRASOUND IMAGING | Age: 46
Discharge: HOME | End: 2021-03-31
Attending: STUDENT IN AN ORGANIZED HEALTH CARE EDUCATION/TRAINING PROGRAM

## 2021-03-31 VITALS — HEART RATE: 74 BPM | SYSTOLIC BLOOD PRESSURE: 92 MMHG | OXYGEN SATURATION: 100 % | DIASTOLIC BLOOD PRESSURE: 61 MMHG

## 2021-03-31 DIAGNOSIS — K70.31 ALCOHOLIC CIRRHOSIS OF LIVER WITH ASCITES (HCC): ICD-10-CM

## 2021-03-31 PROCEDURE — 49083 ABD PARACENTESIS W/IMAGING: CPT

## 2021-03-31 PROCEDURE — 77030038620

## 2021-03-31 PROCEDURE — P9047 ALBUMIN (HUMAN), 25%, 50ML: HCPCS | Performed by: PHYSICIAN ASSISTANT

## 2021-03-31 PROCEDURE — 77030041470 HC TBNG SET PARA GISP -B

## 2021-03-31 PROCEDURE — 74011250636 HC RX REV CODE- 250/636: Performed by: PHYSICIAN ASSISTANT

## 2021-03-31 RX ORDER — ALBUMIN HUMAN 250 G/1000ML
25 SOLUTION INTRAVENOUS ONCE
Status: DISCONTINUED | OUTPATIENT
Start: 2021-03-31 | End: 2021-03-31

## 2021-03-31 RX ORDER — LIDOCAINE HYDROCHLORIDE 10 MG/ML
10 INJECTION, SOLUTION EPIDURAL; INFILTRATION; INTRACAUDAL; PERINEURAL
Status: COMPLETED | OUTPATIENT
Start: 2021-03-31 | End: 2021-03-31

## 2021-03-31 RX ORDER — ALBUMIN HUMAN 250 G/1000ML
25 SOLUTION INTRAVENOUS ONCE
Status: COMPLETED | OUTPATIENT
Start: 2021-03-31 | End: 2021-03-31

## 2021-03-31 RX ADMIN — LIDOCAINE HYDROCHLORIDE 10 ML: 10 INJECTION, SOLUTION EPIDURAL; INFILTRATION; INTRACAUDAL; PERINEURAL at 08:45

## 2021-03-31 RX ADMIN — ALBUMIN (HUMAN) 25 G: 0.25 INJECTION, SOLUTION INTRAVENOUS at 09:06

## 2021-03-31 NOTE — PROGRESS NOTES
0840 Pt here for US Paracentesis. [address] in [address]. Allergies reviewed and consent signed. Timeout at [address], Procedure started at [address] Total amount of 6.7ml of clear yellow fluid drained from right side abdomen. Clean dressing appied to right abd. Administered total of  25g of Albumin via L AC PIV. VS monitored throughout procedure. Reviewed discharge instructions with patient and copy provided. 0279  Pt escorted out to vehicle via w/c for discharge home with friend [place].

## 2021-03-31 NOTE — DISCHARGE INSTRUCTIONS
PARACENTESIS DISCHARGE INSTRUCTIONS    General Information:  During this procedure, the doctor will insert a needle into the abdomen to drain fluid. After the procedure, you will be able to take a deep breath much easier. The site of the puncture may ooze the first day. This will decrease and eventually stop. Paracentesis (draining fluid from the abdomen) sometimes makes patients hypotensive (low blood pressure). Your doctor may order for you to receive fluids or albumin (a volume booster) during the procedure through an IV site. Home Care Instructions:  Keep the puncture site clean and dry. No tub baths or swimming until puncture site heals. Showering is acceptable. Resume your normal diet, and resume your normal activity slowly and as you tolerate. If you are short of breath, rest. If shortness of breath does not ease, please call your ordering doctor. Fluid can re-accumulate in the chest and/or in the abdomen. If this should occur, your doctor needs to know as you may need to have the procedure done again. Call If:     You should call your Physician and/or the Radiology Nurse if you notice any signs of infection, like pus draining, or if it is swollen or reddened. Also call if you have a fever, or if you are bleeding from the puncture site more than a small amount on the dressing. Call if the puncture site keeps draining fluid. Some oozing is to be expected, but should slow and then stop. Call if you feel like you have pressure in your abdomen. SEEK IMMEDIATE CARE OR CALL [phone] IF YOU SUDDENLY HAVE TROUBLE BREATHING, OR IF YOUR LIPS TURN BLUE, OR IF YOU NOTICE BLOOD IN YOUR SPUTUM. Follow-Up Instructions: Please see your ordering doctor as he/she has requested. To Reach Us:  Call [phone]      Discharging Nurse:Adri GOTTLIEB [facility]      Patient Education        Aprenda acerca de la paracentesis  Learning About Paracentesis  ¿Qué es la paracentesis?   La paracentesis es un procedimiento que extrae líquido del abdomen (vientre). La acumulación de líquido puede estar causada por william infección, william inflamación, william lesión u otros problemas. La hinchazón por demasiado líquido puede causar dolor o problemas para respirar. El médico extraerá el líquido sobrante con [place] Mimes a un tubo. Lyn médico puede extraer el líquido para:  · Diagnosticar william infección, william lesión o alguna otra afección. · Aliviar la presión en el abdomen. ¿Cómo se hace el procedimiento? El médico o la enfermera le limpiarán la leonie del abdomen donde le insertarán la aguja. Luego le pondrán toallas estériles alrededor de la leonie. Es posible que le apliquen en el vientre william inyección de medicamento para adormecer la leonie. Luego, el médico le insertará suavemente william aguja donde está el líquido. Es posible que adhiera un tubo (sonda) al sitio para ayudar a recolectar el líquido. El procedimiento puede llevar de unos pocos minutos a 30 minutos o más. Después de que se ha drenado el líquido, el médico le sacará la aguja o la sonda y pondrá william venda en el sitio. ¿Qué puede esperar después del procedimiento? El médico le examinará el pulso, la presión arterial y la temperatura por alrededor de [facility] hora. Si el médico piensa que un análisis del líquido puede ayudar a encontrar la causa de un problema, lo enviará a un laboratorio. Es posible que por hasta 2 días después del procedimiento le salga william pequeña cantidad de líquido elvia del sitio donde le insertaron la aguja, especialmente si le extrajeron william gran cantidad de líquido. Quizá deba cambiar la venda del sitio. Después del procedimiento, usted puede hacer vesna actividades normales, a menos que el [employer] diga que no. Si se acumula líquido en lyn vientre nuevamente, es posible que el médico repita armen procedimiento. ¿Cuándo debe pedir ayuda? Llame al 911 en cualquier momento que piense que puede necesitar atención de emergencia.  Por ejemplo, llame si:  · Se desmayó (perdió el conocimiento). Llame a lyn médico ahora mismo o busque atención médica inmediata si:  · Tiene síntomas de infección, tales ara:  ? Aumento de dolor, hinchazón, temperatura o enrojecimiento. ? Vetas ordonez o pus.  ? Thersa Ruder. · Está mareado o aturdido, o siente que [employer]. · Tiene un dolor abdominal nuevo o más intenso. Vigile muy de cerca los cambios en lyn tricai, y asegúrese de comunicarse con lyn médico si:  · Se acumula líquido en lyn abdomen otra vez. · No mejora ara se esperaba. ¿Dónde puede encontrar más información en inglés? Vaya a http://www.gray.com/  Howie X447 en la búsqueda para aprender más acerca de \"Aprenda acerca de la paracentesis. \"  Revisado: 15 michael, 2020               Versión del contenido: 12.6  © 6073-8628 Healthwise, Incorporated. Las instrucciones de cuidado fueron adaptadas bajo licencia por [employer] (which disclaims liability or warranty for this information). Si usted tiene [place] afección médica o sobre estas instrucciones, siempre pregunte a lyn profesional de tricia. [employer] niega toda garantía o responsabilidad por lyn uso de esta información.

## 2021-04-07 ENCOUNTER — TRANSCRIBE ORDER (OUTPATIENT)
Dept: SCHEDULING | Age: 46
End: 2021-04-07

## 2021-04-07 ENCOUNTER — HOSPITAL ENCOUNTER (OUTPATIENT)
Dept: ULTRASOUND IMAGING | Age: 46
Discharge: HOME | End: 2021-04-07
Attending: STUDENT IN AN ORGANIZED HEALTH CARE EDUCATION/TRAINING PROGRAM

## 2021-04-07 VITALS
DIASTOLIC BLOOD PRESSURE: 60 MMHG | SYSTOLIC BLOOD PRESSURE: 84 MMHG | RESPIRATION RATE: 14 BRPM | HEART RATE: 76 BPM | OXYGEN SATURATION: 100 %

## 2021-04-07 DIAGNOSIS — K70.30 ALCOHOLIC CIRRHOSIS (HCC): Primary | ICD-10-CM

## 2021-04-07 DIAGNOSIS — K70.31 ALCOHOLIC CIRRHOSIS OF LIVER WITH ASCITES (HCC): ICD-10-CM

## 2021-04-07 DIAGNOSIS — R18.8 ASCITES: ICD-10-CM

## 2021-04-07 PROCEDURE — 77030038620

## 2021-04-07 PROCEDURE — 74011250636 HC RX REV CODE- 250/636: Performed by: STUDENT IN AN ORGANIZED HEALTH CARE EDUCATION/TRAINING PROGRAM

## 2021-04-07 PROCEDURE — 77030041470 HC TBNG SET PARA GISP -B

## 2021-04-07 PROCEDURE — 74011000250 HC RX REV CODE- 250: Performed by: STUDENT IN AN ORGANIZED HEALTH CARE EDUCATION/TRAINING PROGRAM

## 2021-04-07 PROCEDURE — 49083 ABD PARACENTESIS W/IMAGING: CPT

## 2021-04-07 PROCEDURE — P9047 ALBUMIN (HUMAN), 25%, 50ML: HCPCS | Performed by: STUDENT IN AN ORGANIZED HEALTH CARE EDUCATION/TRAINING PROGRAM

## 2021-04-07 RX ORDER — LIDOCAINE HYDROCHLORIDE 10 MG/ML
10 INJECTION, SOLUTION EPIDURAL; INFILTRATION; INTRACAUDAL; PERINEURAL
Status: COMPLETED | OUTPATIENT
Start: 2021-04-07 | End: 2021-04-07

## 2021-04-07 RX ORDER — ALBUMIN HUMAN 250 G/1000ML
25 SOLUTION INTRAVENOUS ONCE
Status: COMPLETED | OUTPATIENT
Start: 2021-04-07 | End: 2021-04-07

## 2021-04-07 RX ADMIN — ALBUMIN (HUMAN) 25 G: 0.25 INJECTION, SOLUTION INTRAVENOUS at 09:03

## 2021-04-07 RX ADMIN — LIDOCAINE HYDROCHLORIDE 10 ML: 10 INJECTION, SOLUTION EPIDURAL; INFILTRATION; INTRACAUDAL; PERINEURAL at 08:45

## 2021-04-07 NOTE — PROGRESS NOTES
Patient brought to ultrasound for pre procedure scan. Ambulated well and without assistance. Changed into gown. ID verified and Allergies verified. Wrist bands applied. Monitor x2 applied, vitals taken, physician called to bedside for consent. Dr. Delmer Jolly at bedside at 5136, consent obtained using   Time out at [facility] of procedure 0830  End of procedure 0908    Total amount of fluid drained  5600mls of clear yellow fluid , no specimens sent to lab, therapeutic only. Patient tolerated well, bandage applied to procedure site that is CDI.    50g of albumin given via IV. Patient taken via wheelchair to awaiting transportation. Refused discharge paperwork, patient comes weekly for procedure.

## 2021-04-14 ENCOUNTER — HOSPITAL ENCOUNTER (OUTPATIENT)
Dept: ULTRASOUND IMAGING | Age: 46
Discharge: HOME | End: 2021-04-14
Attending: STUDENT IN AN ORGANIZED HEALTH CARE EDUCATION/TRAINING PROGRAM

## 2021-04-14 NOTE — PROGRESS NOTES
64 called  Cid Aissatou to inquire if he will be presenting for his weekly paracentesis, he advised not today but will be here next week.

## 2021-04-21 ENCOUNTER — HOSPITAL ENCOUNTER (OUTPATIENT)
Dept: ULTRASOUND IMAGING | Age: 46
Discharge: HOME | End: 2021-04-21
Attending: STUDENT IN AN ORGANIZED HEALTH CARE EDUCATION/TRAINING PROGRAM

## 2021-04-21 VITALS — SYSTOLIC BLOOD PRESSURE: 87 MMHG | HEART RATE: 76 BPM | OXYGEN SATURATION: 100 % | DIASTOLIC BLOOD PRESSURE: 60 MMHG

## 2021-04-21 DIAGNOSIS — K70.31 ALCOHOLIC CIRRHOSIS OF LIVER WITH ASCITES (HCC): ICD-10-CM

## 2021-04-21 LAB
ALBUMIN FLD-MCNC: 1.3 G/DL
COMMENT, HOLDF: NORMAL
PROT FLD-MCNC: 3.1 G/DL
SAMPLES BEING HELD,HOLD: NORMAL
SPECIMEN SOURCE FLD: NORMAL
SPECIMEN SOURCE FLD: NORMAL

## 2021-04-21 PROCEDURE — 77030038620

## 2021-04-21 PROCEDURE — P9047 ALBUMIN (HUMAN), 25%, 50ML: HCPCS | Performed by: RADIOLOGY

## 2021-04-21 PROCEDURE — 74011000250 HC RX REV CODE- 250: Performed by: RADIOLOGY

## 2021-04-21 PROCEDURE — 77030041470 HC TBNG SET PARA GISP -B

## 2021-04-21 PROCEDURE — 82042 OTHER SOURCE ALBUMIN QUAN EA: CPT

## 2021-04-21 PROCEDURE — 84311 SPECTROPHOTOMETRY: CPT

## 2021-04-21 PROCEDURE — 49083 ABD PARACENTESIS W/IMAGING: CPT

## 2021-04-21 PROCEDURE — 84157 ASSAY OF PROTEIN OTHER: CPT

## 2021-04-21 PROCEDURE — 74011250636 HC RX REV CODE- 250/636: Performed by: RADIOLOGY

## 2021-04-21 RX ORDER — ALBUMIN HUMAN 250 G/1000ML
25 SOLUTION INTRAVENOUS ONCE
Status: COMPLETED | OUTPATIENT
Start: 2021-04-21 | End: 2021-04-21

## 2021-04-21 RX ORDER — LIDOCAINE HYDROCHLORIDE 10 MG/ML
10 INJECTION, SOLUTION EPIDURAL; INFILTRATION; INTRACAUDAL; PERINEURAL
Status: COMPLETED | OUTPATIENT
Start: 2021-04-21 | End: 2021-04-21

## 2021-04-21 RX ADMIN — LIDOCAINE HYDROCHLORIDE 10 ML: 10 INJECTION, SOLUTION EPIDURAL; INFILTRATION; INTRACAUDAL; PERINEURAL at 08:30

## 2021-04-21 RX ADMIN — ALBUMIN (HUMAN) 25 G: 0.25 INJECTION, SOLUTION INTRAVENOUS at 08:45

## 2021-04-21 NOTE — PROGRESS NOTES
0834 Pt here for US Paracentesis. 9595 Dr Sravan Cheung in [address]. Allergies reviewed and [place]. [place] [address], Procedure started at [phone] and ended at  [facility]. Total amount of 8060ml of clear yellow fluid drained from right side of abdomen. Clean dressing appied to right abd. Administered total of  25gm of Albumin via L AC PIV. VS monitored throughout procedure. Reviewed discharge instructions with patient and copy provided. 1998  Pt escorted out to vehicle for discharge home.

## 2021-04-21 NOTE — DISCHARGE INSTRUCTIONS
Patient Education        Aprenda acerca de la paracentesis  Learning About Paracentesis  ¿Qué es la paracentesis? La paracentesis es un procedimiento que extrae líquido del abdomen (vientre). La acumulación de líquido puede estar causada por william infección, william inflamación, william lesión u otros problemas. La hinchazón por demasiado líquido puede causar dolor o problemas para respirar. El médico extraerá el líquido sobrante con [place] Amel Nikunj Olivia a un tubo. ¿Por qué se hace william paracentesis? La paracentesis puede hacerse para:  · Encontrar la causa de la acumulación de líquido en el abdomen. · Diagnosticar william infección en el líquido peritoneal.  · Detectar ciertos tipos de cáncer. · Extraer william gran cantidad de líquido que está causando dolor o dificultad para respirar o que está afectando el funcionamiento de los riñones o los intestinos. · Averiguar si hay daño después de william lesión abdominal.  ¿Cómo se hace el procedimiento? Vaciará la vejiga antes del procedimiento. El médico o la enfermera le limpiarán la leonie del abdomen donde le insertarán la aguja. Luego le pondrán toallas estériles alrededor de la leonie. Pueden inyectarle un medicamento anestésico en la piel del abdomen. Luego, el médico le introducirá suavemente william aguja donde está el líquido. El médico puede conectar un tubo (sonda) al sitio para ayudar a recolectar el líquido. Después de que se ha drenado el líquido, el médico le sacará la aguja o la sonda y pondrá william venda en el sitio. ¿Cuánto tiempo dura william paracentesis? El procedimiento puede llevar de unos pocos minutos a 30 minutos o más [place]. ¿Qué ocurre después de la prueba? Puede hacer vesna actividades normales después del procedimiento, a menos que el médico le diga lo contrario. Después del procedimiento, es posible que salga un poco de líquido transparente del sitio, especialmente si se extrajo william gran cantidad de líquido. Habrá menos secreción al cabo de 1 o 2 días. Pregúntele a lyn médico qué cantidad de secreción puede prever. Podría necesitar william gasa pequeña y [place] venda. Quizá deba cambiar la venda. Si el médico opina que un análisis del líquido puede ayudar a encontrar la causa de un problema, enviará el líquido a un laboratorio. Si vuelve a acumularse líquido en el abdomen, es posible que el médico repita armen procedimiento. ¿Cuándo debe pedir ayuda? Llame al 911 en cualquier momento que piense que puede necesitar atención de emergencia. Por ejemplo, llame si:  · Se desmayó (perdió el conocimiento). Llame a lyn médico ahora mismo o busque atención médica inmediata si:  · Tiene síntomas de infección, tales ara:  ? Aumento de dolor, hinchazón, temperatura o enrojecimiento. ? Vetas ordonez o pus.  ? Riddhi Raddle. · Está mareado o aturdido, o siente que [employer]. · Tiene un dolor abdominal nuevo o más intenso. Vigile muy de cerca los cambios en lyn tricia, y asegúrese de comunicarse con lyn médico si:  · Se acumula líquido en lyn abdomen otra vez. · No mejora ara se esperaba. ¿Dónde puede encontrar más información en inglés? Vaya a http://www.calderon.com/  Dejorge Reyez X447 en la búsqueda para aprender más acerca de \"Aprenda acerca de la paracentesis. \"  Revisado: 15 michael, 2020               Versión del contenido: 12.8  © 6254-7392 Healthwise, Incorporated. Las instrucciones de cuidado fueron adaptadas bajo licencia por [employer] (which disclaims liability or warranty for this information). Si usted tiene [place] afección médica o sobre estas instrucciones, siempre pregunte a lyn profesional de tricia. [facility] niega toda garantía o responsabilidad por lyn uso de esta información.

## 2021-04-28 ENCOUNTER — HOSPITAL ENCOUNTER (OUTPATIENT)
Dept: ULTRASOUND IMAGING | Age: 46
Discharge: HOME | End: 2021-04-28
Attending: STUDENT IN AN ORGANIZED HEALTH CARE EDUCATION/TRAINING PROGRAM

## 2021-04-28 VITALS
SYSTOLIC BLOOD PRESSURE: 87 MMHG | DIASTOLIC BLOOD PRESSURE: 63 MMHG | RESPIRATION RATE: 19 BRPM | HEART RATE: 68 BPM | OXYGEN SATURATION: 100 %

## 2021-04-28 DIAGNOSIS — K70.31 ALCOHOLIC CIRRHOSIS OF LIVER WITH ASCITES (HCC): ICD-10-CM

## 2021-04-28 PROCEDURE — 77030041470 HC TBNG SET PARA GISP -B

## 2021-04-28 PROCEDURE — P9047 ALBUMIN (HUMAN), 25%, 50ML: HCPCS | Performed by: STUDENT IN AN ORGANIZED HEALTH CARE EDUCATION/TRAINING PROGRAM

## 2021-04-28 PROCEDURE — 49083 ABD PARACENTESIS W/IMAGING: CPT

## 2021-04-28 PROCEDURE — 74011250636 HC RX REV CODE- 250/636: Performed by: STUDENT IN AN ORGANIZED HEALTH CARE EDUCATION/TRAINING PROGRAM

## 2021-04-28 PROCEDURE — 77030038620

## 2021-04-28 RX ORDER — ALBUMIN HUMAN 250 G/1000ML
12.5 SOLUTION INTRAVENOUS ONCE
Status: COMPLETED | OUTPATIENT
Start: 2021-04-28 | End: 2021-04-28

## 2021-04-28 RX ADMIN — ALBUMIN (HUMAN) 12.5 G: 0.25 INJECTION, SOLUTION INTRAVENOUS at 08:46

## 2021-04-28 NOTE — PROGRESS NOTES
0830- Pt placed in Ultrasound for as out pt paracentesis. LFT AC #22 PIV placed with positive blood return. 4307- PAX [employer]  Melissa used for informed consent with Dr. Jaren Mir at bedside. Pt's questions where answered with the . 0090- Time out performed. Dr. Jaren Mir used a one step pigtail cathter and placed Rt lower abdomen. Cathter was hooked to ReNova suction device. Please see MAR for albumin. 0935- total amount of para fluid 6000 ml of yellow fluid removed. Rt lower abdomen cathter removed and band aid placed over site. Pt tolerated procedure well. Pt called for his. 0940- Pt walked out to the front lobby and awaiting his ride.

## 2021-05-25 LAB
Lab: NORMAL
REFERENCE LAB,REFLB: NORMAL
TEST DESCRIPTION:,ATST: NORMAL

## 2021-06-02 ENCOUNTER — TRANSCRIBE ORDER (OUTPATIENT)
Dept: SCHEDULING | Age: 46
End: 2021-06-02

## 2021-06-02 DIAGNOSIS — R18.8 ASCITES: ICD-10-CM

## 2021-06-02 DIAGNOSIS — K70.30 ALCOHOLIC CIRRHOSIS (HCC): Primary | ICD-10-CM

## 2021-06-07 ENCOUNTER — TELEPHONE (OUTPATIENT)
Dept: HEMATOLOGY | Age: 46
End: 2021-06-07

## 2021-06-09 ENCOUNTER — HOSPITAL ENCOUNTER (OUTPATIENT)
Dept: ULTRASOUND IMAGING | Age: 46
Discharge: HOME | End: 2021-06-09
Attending: STUDENT IN AN ORGANIZED HEALTH CARE EDUCATION/TRAINING PROGRAM

## 2021-06-09 VITALS
OXYGEN SATURATION: 100 % | HEART RATE: 65 BPM | RESPIRATION RATE: 21 BRPM | SYSTOLIC BLOOD PRESSURE: 89 MMHG | DIASTOLIC BLOOD PRESSURE: 68 MMHG

## 2021-06-09 DIAGNOSIS — K70.31 ALCOHOLIC CIRRHOSIS OF LIVER WITH ASCITES (HCC): ICD-10-CM

## 2021-06-09 PROCEDURE — 49083 ABD PARACENTESIS W/IMAGING: CPT

## 2021-06-09 PROCEDURE — 77030041470 HC TBNG SET PARA GISP -B

## 2021-06-09 PROCEDURE — 74011000250 HC RX REV CODE- 250: Performed by: RADIOLOGY

## 2021-06-09 PROCEDURE — 74011250636 HC RX REV CODE- 250/636: Performed by: PHYSICIAN ASSISTANT

## 2021-06-09 PROCEDURE — P9047 ALBUMIN (HUMAN), 25%, 50ML: HCPCS | Performed by: PHYSICIAN ASSISTANT

## 2021-06-09 PROCEDURE — 77030038620

## 2021-06-09 RX ORDER — ALBUMIN HUMAN 250 G/1000ML
12.5 SOLUTION INTRAVENOUS
Status: DISCONTINUED | OUTPATIENT
Start: 2021-06-09 | End: 2021-06-13 | Stop reason: HOSPADM

## 2021-06-09 RX ORDER — LIDOCAINE HYDROCHLORIDE 10 MG/ML
10 INJECTION, SOLUTION EPIDURAL; INFILTRATION; INTRACAUDAL; PERINEURAL
Status: COMPLETED | OUTPATIENT
Start: 2021-06-09 | End: 2021-06-09

## 2021-06-09 RX ADMIN — ALBUMIN (HUMAN) 12.5 G: 0.25 INJECTION, SOLUTION INTRAVENOUS at 10:11

## 2021-06-09 RX ADMIN — LIDOCAINE HYDROCHLORIDE 10 ML: 10 INJECTION, SOLUTION EPIDURAL; INFILTRATION; INTRACAUDAL; PERINEURAL at 09:45

## 2021-07-19 ENCOUNTER — TRANSCRIBE ORDER (OUTPATIENT)
Dept: SCHEDULING | Age: 46
End: 2021-07-19

## 2021-07-19 DIAGNOSIS — R18.8 ASCITIC FLUID: ICD-10-CM

## 2021-07-19 DIAGNOSIS — K70.30 ALCOHOLIC CIRRHOSIS (HCC): Primary | ICD-10-CM

## 2021-08-04 ENCOUNTER — HOSPITAL ENCOUNTER (OUTPATIENT)
Dept: ULTRASOUND IMAGING | Age: 46
Discharge: HOME | End: 2021-08-04
Attending: INTERNAL MEDICINE

## 2021-08-04 VITALS
OXYGEN SATURATION: 99 % | RESPIRATION RATE: 14 BRPM | SYSTOLIC BLOOD PRESSURE: 95 MMHG | HEART RATE: 67 BPM | DIASTOLIC BLOOD PRESSURE: 67 MMHG

## 2021-08-04 DIAGNOSIS — R18.8 ASCITIC FLUID: ICD-10-CM

## 2021-08-04 DIAGNOSIS — K70.30 ALCOHOLIC CIRRHOSIS (HCC): ICD-10-CM

## 2021-08-04 LAB
COMMENT, HOLDF: NORMAL
SAMPLES BEING HELD,HOLD: NORMAL

## 2021-08-04 PROCEDURE — C1729 CATH, DRAINAGE: HCPCS

## 2021-08-04 PROCEDURE — 49083 ABD PARACENTESIS W/IMAGING: CPT

## 2021-08-04 RX ORDER — ALBUMIN HUMAN 50 G/1000ML
25 SOLUTION INTRAVENOUS ONCE
Status: DISCONTINUED | OUTPATIENT
Start: 2021-08-04 | End: 2021-08-05 | Stop reason: HOSPADM

## 2021-08-04 RX ORDER — LIDOCAINE HYDROCHLORIDE 10 MG/ML
10 INJECTION, SOLUTION EPIDURAL; INFILTRATION; INTRACAUDAL; PERINEURAL
Status: COMPLETED | OUTPATIENT
Start: 2021-08-04 | End: 2021-08-04

## 2021-08-04 RX ADMIN — LIDOCAINE HYDROCHLORIDE 10 ML: 10 INJECTION, SOLUTION EPIDURAL; INFILTRATION; INTRACAUDAL; PERINEURAL at 13:25

## 2021-08-04 NOTE — DISCHARGE INSTRUCTIONS
Patient Education        Aprenda acerca de la paracentesis  Learning About Paracentesis  ¿Qué es la paracentesis? La paracentesis es un procedimiento que extrae líquido del abdomen (vientre). La acumulación de líquido puede estar causada por william infección, william inflamación, william lesión u otros problemas. La hinchazón por demasiado líquido puede causar dolor o problemas para respirar. El médico extraerá el líquido sobrante con [place]hal Sessions Shay Lesch a un tubo. ¿Por qué se hace william paracentesis? La paracentesis puede hacerse para:  · Encontrar la causa de la acumulación de líquido en el abdomen. · Diagnosticar william infección en el líquido peritoneal.  · Detectar ciertos tipos de cáncer. · Extraer william gran cantidad de líquido que está causando dolor o dificultad para respirar o que está afectando el funcionamiento de los riñones o los intestinos. · Averiguar si hay daño después de william lesión abdominal.  ¿Cómo se hace el procedimiento? Vaciará la vejiga antes del procedimiento. El médico o la enfermera le limpiarán la leonie del abdomen donde le insertarán la aguja. Luego le pondrán toallas estériles alrededor de la leonie. Pueden inyectarle un medicamento anestésico en la piel del abdomen. Luego, el médico le introducirá suavemente william aguja donde está el líquido. El médico puede conectar un tubo (sonda) al sitio para ayudar a recolectar el líquido. Después de que se ha drenado el líquido, el médico le sacará la aguja o la sonda y pondrá william venda en el sitio. ¿Cuánto tiempo dura william paracentesis? El procedimiento puede llevar de unos pocos minutos a 30 minutos o más [place]. ¿Qué ocurre después de la prueba? Puede hacer vesna actividades normales después del procedimiento, a menos que el médico le diga lo contrario. Después del procedimiento, es posible que salga un poco de líquido transparente del sitio, especialmente si se extrajo william gran cantidad de líquido. Habrá menos secreción al cabo de 1 o 2 días. Pregúntele a lyn médico qué cantidad de secreción puede prever. Podría necesitar william gasa pequeña y [place] venda. Quizá deba cambiar la venda. Si el médico opina que un análisis del líquido puede ayudar a encontrar la causa de un problema, enviará el líquido a un laboratorio. Si vuelve a acumularse líquido en el abdomen, es posible que el médico repita armen procedimiento. ¿Cuándo debe pedir ayuda? Llame al 911 en cualquier momento que piense que puede necesitar atención de emergencia. Por ejemplo, llame si:  · Se desmayó (perdió el conocimiento). Llame a lyn médico ahora mismo o busque atención médica inmediata si:  · Tiene síntomas de infección, tales ara:  ? Aumento de dolor, hinchazón, temperatura o enrojecimiento. ? Vetas ordonez o pus.  ? Jacquiline Antis. · Está mareado o aturdido, o siente que [employer]. · Tiene un dolor abdominal nuevo o más intenso. Vigile muy de cerca los cambios en lyn tricia, y asegúrese de comunicarse con lyn médico si:  · Se acumula líquido en lyn abdomen otra vez. · No mejora ara se esperaba. ¿Dónde puede encontrar más información en inglés? Vaya a http://www.gray.com/  Howie X447 en la búsqueda para aprender más acerca de \"Aprenda acerca de la paracentesis. \"  Revisado: 15 michael, 2020               Versión del contenido: 12.8  © 4799-8412 Healthwise, Incorporated. Las instrucciones de cuidado fueron adaptadas bajo licencia por [employer] (which disclaims liability or warranty for this information). Si usted tiene [place] afección médica o sobre estas instrucciones, siempre pregunte a lyn profesional de tricia. [facility] niega toda garantía o responsabilidad por lyn uso de esta información.

## 2021-08-04 NOTE — PROGRESS NOTES
Patient arrives in ultrasound today for an ultrasound guided paracentesis    ID verified, patient is alert and oriented x 4    Dr Godoy Serve at bedside for consent at [phone], all questions answered    Time out completed at 1308    Procedure start time 1309    Dr Godoy Serve prepped under sterile technique, right abdomen accessed with a 6 [place] 12 cm pigtail, with clear yellow fluid return    Connected to [place] drainage canister    Total drainage out was 3250mL    Procedure end time 1348    Dressing applied to right abdomen by US tech Asha, dressing is clean, dry, and intact    Patient tolerated overall procedure well    Discharge papers provided, taken to exit by Asha.   Friend waiting

## 2021-09-08 ENCOUNTER — TRANSCRIBE ORDER (OUTPATIENT)
Dept: SCHEDULING | Age: 46
End: 2021-09-08

## 2021-09-08 DIAGNOSIS — K70.30 ALCOHOLIC CIRRHOSIS OF LIVER (HCC): Primary | ICD-10-CM

## 2021-09-08 DIAGNOSIS — R18.8 ASCITES: ICD-10-CM

## 2021-09-15 ENCOUNTER — HOSPITAL ENCOUNTER (OUTPATIENT)
Dept: ULTRASOUND IMAGING | Age: 46
Discharge: HOME | End: 2021-09-15
Attending: INTERNAL MEDICINE

## 2021-09-15 VITALS
OXYGEN SATURATION: 98 % | RESPIRATION RATE: 12 BRPM | HEART RATE: 67 BPM | SYSTOLIC BLOOD PRESSURE: 91 MMHG | DIASTOLIC BLOOD PRESSURE: 63 MMHG

## 2021-09-15 DIAGNOSIS — K70.30 ALCOHOLIC CIRRHOSIS OF LIVER (HCC): ICD-10-CM

## 2021-09-15 DIAGNOSIS — R18.8 ASCITES: ICD-10-CM

## 2021-09-15 PROCEDURE — 77030041470 HC TBNG SET PARA GISP -B

## 2021-09-15 PROCEDURE — 77030038620

## 2021-09-15 PROCEDURE — 77030018870 HC TY PARCNT BD -B

## 2021-09-15 PROCEDURE — 74011250636 HC RX REV CODE- 250/636: Performed by: INTERNAL MEDICINE

## 2021-09-15 PROCEDURE — P9047 ALBUMIN (HUMAN), 25%, 50ML: HCPCS | Performed by: INTERNAL MEDICINE

## 2021-09-15 PROCEDURE — 49083 ABD PARACENTESIS W/IMAGING: CPT

## 2021-09-15 RX ORDER — LIDOCAINE HYDROCHLORIDE 10 MG/ML
10 INJECTION, SOLUTION EPIDURAL; INFILTRATION; INTRACAUDAL; PERINEURAL
Status: COMPLETED | OUTPATIENT
Start: 2021-09-15 | End: 2021-09-15

## 2021-09-15 RX ORDER — ALBUMIN HUMAN 250 G/1000ML
12.5 SOLUTION INTRAVENOUS
Status: DISCONTINUED | OUTPATIENT
Start: 2021-09-15 | End: 2021-09-19 | Stop reason: HOSPADM

## 2021-09-15 RX ADMIN — ALBUMIN (HUMAN) 12.5 G: 0.25 INJECTION, SOLUTION INTRAVENOUS at 09:12

## 2021-09-15 RX ADMIN — ALBUMIN (HUMAN) 12.5 G: 0.25 INJECTION, SOLUTION INTRAVENOUS at 09:27

## 2021-09-15 RX ADMIN — LIDOCAINE HYDROCHLORIDE 10 ML: 10 INJECTION, SOLUTION EPIDURAL; INFILTRATION; INTRACAUDAL; PERINEURAL at 09:00

## 2021-09-15 NOTE — PROGRESS NOTES
8814- Pt in Ultrasound for paracentesis. Pt is Sammar[place] speaking [employer]atus at bedside with [facility] #[id number]. Pt is A&Ox3 with no C/O pain. 7814- LFT AC PIV #22 flushed with saline and positive blood return. 6818- Dr. Annette Reddy obtained informed consent for ultrasound guided paracentesis with  Jeffery. 0260- Time out performed and Dr. Annette Reddy placed 5 fr pigtail cathter RT lower abdomen. Cathter placed to renova suction device. 3701- Please see MAR for Albumin. 5253- Total amount of dark yellow para fluid removed was 5300 ml. 5 fr cathter was removed and gauze and Tegaderm over site. Pt tolerated procedure well. 3240- LFT AC PIV #22 removed and gauze and coban placed over site. 8773- discharge instructions given to pt he had no questions or concerns. Pt discharged to [facility] to the care of his friend Violetta & Louie Robledo.

## 2021-09-15 NOTE — PROCEDURES
Interventional Radiology  Procedure Note        9/15/2021 9:16 AM    Patient: Kenton Mahoney     Informed consent obtained    Diagnosis: Ascites    Procedure(s): ultrasound guided paracentesis    Specimens removed:  None; ongoing drainage    Complications: None    Primary Physician: Luster Osler, MD    Recommendations: N/A    Discharge Disposition: Stable; discharge to home    Full dictated report to follow    Luster Osler, MD  Interventional Radiology  [facility].  9:16 AM, 9/15/2021

## 2021-11-09 ENCOUNTER — TRANSCRIBE ORDER (OUTPATIENT)
Dept: SCHEDULING | Age: 46
End: 2021-11-09

## 2021-11-09 DIAGNOSIS — K70.30 ALCOHOLIC CIRRHOSIS (HCC): Primary | ICD-10-CM

## 2021-11-10 ENCOUNTER — TRANSCRIBE ORDER (OUTPATIENT)
Dept: SCHEDULING | Age: 46
End: 2021-11-10

## 2021-11-12 ENCOUNTER — HOSPITAL ENCOUNTER (OUTPATIENT)
Dept: ULTRASOUND IMAGING | Age: 46
Discharge: HOME | End: 2021-11-12

## 2021-11-12 DIAGNOSIS — K70.30 ALCOHOLIC CIRRHOSIS (HCC): ICD-10-CM

## 2021-11-12 PROCEDURE — 76705 ECHO EXAM OF ABDOMEN: CPT

## 2022-01-21 NOTE — PROGRESS NOTES
1353 CPAP/PS 8/5 x 185 minutes, called by RN to terminate CPAP/PS trial second to tachycardia. Spontaneous Vt 608 RR 17 Ve 9.1 RSBI 28, placed back on VC AC mode previous settings. BS diminished bases, clear. RT to monitor    Ventilation Mode: CPAP/PS  (Continuous positive airway pressure with Pressure Support)  FiO2 (%): 30 %  Rate Set (breaths/minute): 16 breaths/min  Tidal Volume Set (mL): 500 mL  PEEP (cm H2O): 8 cmH2O  Pressure Support (cm H2O): 5 cmH2O  Oxygen Concentration (%): 30 %  Resp: 24       Patient to call before getting out of bed.  Bed in low position and locked

## 2022-07-05 NOTE — PROGRESS NOTES
9892- Pt in ultrasound for paracentesis. Pt A&Ox 3 with no C/O pain . Stratus language interrupter being used at bedside. Chema Fregoso # [id number])  Ernesto MCDONALD obtained informed consent for ultrasound guided paracentesis. 0910- LFT AC #22 placed, positive blood return and flushed with saline. 2228- Time Out performed and Zoila MCDONALD placed a RT lower abdomen 6 FR pigtail cathter and hooked to ReNova suction device. 1015- Total amount of yellow paracentesis fluid removed was 3300 ml, Albumin given via #22 PIV LFT AC. 6 FR pigtail cathter was removed and gauze and Tegaderm placed over rt lower abdomen site. 1024- LFT AC #22 removed and gauze and coban placed over site. Discharge instructions given to pt.   1029- pt taken to front lobby to wait for his taxi. 3